# Patient Record
Sex: FEMALE | Race: BLACK OR AFRICAN AMERICAN | Employment: FULL TIME | URBAN - METROPOLITAN AREA
[De-identification: names, ages, dates, MRNs, and addresses within clinical notes are randomized per-mention and may not be internally consistent; named-entity substitution may affect disease eponyms.]

---

## 2017-01-18 ENCOUNTER — OFFICE VISIT (OUTPATIENT)
Dept: FAMILY MEDICINE CLINIC | Age: 52
End: 2017-01-18

## 2017-01-18 ENCOUNTER — HOSPITAL ENCOUNTER (OUTPATIENT)
Dept: GENERAL RADIOLOGY | Age: 52
Discharge: HOME | End: 2017-01-18
Payer: COMMERCIAL

## 2017-01-18 VITALS
SYSTOLIC BLOOD PRESSURE: 129 MMHG | BODY MASS INDEX: 28.54 KG/M2 | DIASTOLIC BLOOD PRESSURE: 87 MMHG | HEART RATE: 73 BPM | OXYGEN SATURATION: 99 % | TEMPERATURE: 97.9 F | HEIGHT: 66 IN | RESPIRATION RATE: 16 BRPM | WEIGHT: 177.6 LBS

## 2017-01-18 DIAGNOSIS — R05.9 COUGH: ICD-10-CM

## 2017-01-18 DIAGNOSIS — J20.9 ACUTE BRONCHITIS, UNSPECIFIED ORGANISM: ICD-10-CM

## 2017-01-18 DIAGNOSIS — J20.9 ACUTE BRONCHITIS, UNSPECIFIED ORGANISM: Primary | ICD-10-CM

## 2017-01-18 DIAGNOSIS — R07.81 RIB PAIN ON RIGHT SIDE: ICD-10-CM

## 2017-01-18 PROCEDURE — 71020 XR CHEST PA LAT: CPT

## 2017-01-18 RX ORDER — PROMETHAZINE HYDROCHLORIDE AND CODEINE PHOSPHATE 6.25; 1 MG/5ML; MG/5ML
5 SOLUTION ORAL
Qty: 240 ML | Refills: 0 | Status: SHIPPED | OUTPATIENT
Start: 2017-01-18 | End: 2017-06-19

## 2017-01-18 RX ORDER — AZITHROMYCIN 500 MG/1
500 TABLET, FILM COATED ORAL DAILY
Qty: 3 TAB | Refills: 0 | Status: SHIPPED | OUTPATIENT
Start: 2017-01-18 | End: 2017-01-21

## 2017-01-18 NOTE — PROGRESS NOTES
Chief Complaint   Patient presents with    Cough     coughing up yellow phlegm    Back Pain     right back pain-1/10 presently.

## 2017-01-18 NOTE — PROGRESS NOTES
HISTORY OF PRESENT ILLNESS  Magalys Harper is a 46 y.o. female. Cough   The history is provided by the patient. This is a new problem. Episode onset: over a week ago. Episode frequency: intermittently. The problem has been gradually worsening. The cough is productive of purulent sputum. Patient reports a subjective (initially) fever - was not measured. Associated symptoms include eye redness and headaches. Pertinent negatives include no chest pain, no chills, no ear pain, no rhinorrhea, no sore throat, no shortness of breath and no wheezing. Associated symptoms comments: Right posterior rib pain. Treatments tried: Nyquil. The treatment provided significant relief. She is not a smoker. Her past medical history is significant for bronchitis. Her past medical history does not include pneumonia, bronchiectasis, COPD, asthma or heart failure. Review of Systems   Constitutional: Positive for malaise/fatigue. Negative for chills and fever. HENT: Negative for congestion, ear pain, rhinorrhea and sore throat. Mucous is yellow in color. There is no sinus pain. Eyes: Positive for redness. Negative for discharge. Respiratory: Positive for cough, hemoptysis and sputum production. Negative for shortness of breath and wheezing. Her sputum is yellow. She did have hemoptysis once, but that was after blowing her bloody nose. Cardiovascular: Negative for chest pain. Neurological: Positive for headaches. Visit Vitals    /87 (BP 1 Location: Left arm, BP Patient Position: Sitting)    Pulse 73    Temp 97.9 °F (36.6 °C) (Oral)    Resp 16    Ht 5' 6\" (1.676 m)    Wt 177 lb 9.6 oz (80.6 kg)    SpO2 99%    BMI 28.67 kg/m2     Physical Exam   Constitutional: She is oriented to person, place, and time. She appears well-developed and well-nourished. No distress. HENT:   Head: Normocephalic.    Right Ear: Tympanic membrane, external ear and ear canal normal.   Left Ear: Tympanic membrane, external ear and ear canal normal.   Nose: Nose normal. Right sinus exhibits no maxillary sinus tenderness and no frontal sinus tenderness. Left sinus exhibits no maxillary sinus tenderness and no frontal sinus tenderness. Mouth/Throat: Uvula is midline, oropharynx is clear and moist and mucous membranes are normal.   Eyes: Right eye exhibits no discharge. Left eye exhibits no discharge. Right conjunctiva is not injected. Left conjunctiva is not injected. Cardiovascular: Normal rate, regular rhythm and normal heart sounds. Exam reveals no gallop and no friction rub. No murmur heard. Pulmonary/Chest: Effort normal and breath sounds normal. No respiratory distress. She has no wheezes. She has no rales. Lymphadenopathy:     She has no cervical adenopathy. Neurological: She is alert and oriented to person, place, and time. Skin: Skin is warm and dry. She is not diaphoretic. Nursing note and vitals reviewed. ASSESSMENT and PLAN    ICD-10-CM ICD-9-CM    1. Acute bronchitis, unspecified organism J20.9 466.0 azithromycin (ZITHROMAX) 500 mg tab      XR CHEST PA LAT   2. Cough R05 786.2 promethazine-codeine (PHENERGAN WITH CODEINE) 6.25-10 mg/5 mL syrup      XR CHEST PA LAT   3. Rib pain on right side R07.81 786.50 XR CHEST PA LAT        Bronchitis with rib pain due to cough  Rest push fluids  Zithromax  Phenergan with Codeine prn,  web site reviewed. chest X-ray    Follow-up Disposition:  Return if not better in 5 days. Reviewed plan of care. Patient has provided input and agrees with goals.

## 2017-01-18 NOTE — MR AVS SNAPSHOT
Visit Information Date & Time Provider Department Dept. Phone Encounter #  
 1/18/2017 11:15 AM Kevin Cuello MD [facility] 88 [id number] Follow-up Instructions Return if not better in 5 days. Upcoming Health Maintenance Date Due  
 BREAST CANCER SCRN MAMMOGRAM 1/8/2018 PAP AKA CERVICAL CYTOLOGY 10/20/2018 DTaP/Tdap/Td series (2 - Td) 1/20/2021 COLONOSCOPY 12/2/2023 Allergies as of 1/18/2017  Review Complete On: 1/18/2017 By: Kevin Cuello MD  
 No Known Allergies Current Immunizations  Reviewed on 12/14/2016 Name Date Influenza Vaccine (Quad) PF 12/14/2016 10:44 AM, 10/20/2015 Influenza Vaccine Split 10/1/2010 TDAP Vaccine 1/20/2011 Not reviewed this visit You Were Diagnosed With   
  
 Codes Comments Acute bronchitis, unspecified organism    -  Primary ICD-10-CM: J20.9 ICD-9-CM: 466.0 Cough     ICD-10-CM: R05 ICD-9-CM: 786.2 Rib pain on right side     ICD-10-CM: R07.81 ICD-9-CM: 786.50 Vitals BP Pulse Temp Resp Height(growth percentile) Weight(growth percentile) 129/87 (BP 1 Location: Left arm, BP Patient Position: Sitting) 73 97.9 °F (36.6 °C) (Oral) 16 5' 6\" (1.676 m) 177 lb 9.6 oz (80.6 kg) SpO2 BMI OB Status Smoking Status 99% 28.67 kg/m2 Hysterectomy Never Smoker Vitals History BMI and BSA Data Body Mass Index Body Surface Area  
 28.67 kg/m 2 1.94 m 2 Preferred Pharmacy Pharmacy Name Phone [facility] [place], [address] [place] [facility] [phone] Your Updated Medication List  
  
   
This list is accurate as of: 1/18/17 12:32 PM.  Always use your most recent med list.  
  
  
  
  
 azithromycin 500 mg Tab Commonly known as:  Griselda Collar Take 1 Tab by mouth daily for 3 days. Biotin 2,500 mcg Cap Take  by mouth.  
  
 cyanocobalamin 100 mcg tablet Commonly known as:  VITAMIN B12 Take 100 mcg by mouth daily. DAILY MULTI-VITAMINS/IRON tablet Generic drug:  multivitamin with iron Take 1 Tab by mouth daily. FISH OIL-VIT D3 PO Take  by mouth. folic acid 214 mcg tablet Take 800 mcg by mouth daily. Glucosamine &Chondroit-MV-Min3 212-906-78-0.5 mg Tab Take  by mouth. MELATIN PO Take  by mouth nightly. promethazine-codeine 6.25-10 mg/5 mL syrup Commonly known as:  PHENERGAN with CODEINE Take 5 mL by mouth every six (6) hours as needed for Cough. Max Daily Amount: 20 mL. VITAMIN D3 1,000 unit tablet Generic drug:  cholecalciferol Take  by mouth daily. VITAMIN E PO Take  by mouth. Prescriptions Printed Refills  
 promethazine-codeine (PHENERGAN WITH CODEINE) 6.25-10 mg/5 mL syrup 0 Sig: Take 5 mL by mouth every six (6) hours as needed for Cough. Max Daily Amount: 20 mL. Class: Print Route: Oral  
  
Prescriptions Sent to Pharmacy Refills  
 azithromycin (ZITHROMAX) 500 mg tab 0 Sig: Take 1 Tab by mouth daily for 3 days. Class: Normal  
 Pharmacy: [email or web address] [place] [address] [address] [facility] Ph #: [phone] Route: Oral  
  
Follow-up Instructions Return if not better in 5 days. To-Do List   
 01/18/2017 Imaging:  XR CHEST PA LAT   
  
 01/20/2017 11:30 AM  
  Appointment with [facility] 2 at [facility] ([phone]) Shower or bathe using soap and water. Do not use deodorant, powder, perfumes, or lotion the day of your exam.  If your prior mammograms were not performed at [facility] 6 please bring films with you or forward prior images 2 days before your procedure. Check in at registration 15min before your appointment time unless you were instructed to do otherwise.   A script is not necessary, but if you have one, please bring it on the day of the mammogram or have it faxed to the department. [facility] [phone] [facility]  [phone] [facility] 19 JOE  [phone] [facility] [phone] [place] [address] [facility] Sonny Jeffries [phone] Introducing [facility] & [facility] SERVICES! Dear Dusty Ross: Thank you for requesting a ice account. Our records indicate that you already have an active ice account. You can access your account anytime at [email or web address] Did you know that you can access your hospital and ER discharge instructions at any time in ice? You can also review all of your test results from your hospital stay or ER visit. Additional Information If you have questions, please visit the Frequently Asked Questions section of the ice website at [email or web address] Remember, ice is NOT to be used for urgent needs. For medical emergencies, dial 911. Now available from your iPhone and Android! Please provide this summary of care documentation to your next provider. Your primary care clinician is listed as Lolita Tang. If you have any questions after today's visit, please call [phone].

## 2017-01-20 ENCOUNTER — HOSPITAL ENCOUNTER (OUTPATIENT)
Dept: MAMMOGRAPHY | Age: 52
Discharge: HOME | End: 2017-01-20
Payer: COMMERCIAL

## 2017-01-20 DIAGNOSIS — Z12.31 VISIT FOR SCREENING MAMMOGRAM: ICD-10-CM

## 2017-01-20 PROCEDURE — 77067 SCR MAMMO BI INCL CAD: CPT

## 2017-01-20 NOTE — LETTER
1/23/2017 11:35 AM 
 
Ms. Bronson Shelton [address] [place] [id number] Dear Bronson Braxton: 
 
Please find your most recent results below. Resulted Orders CYN[facility]E MAMMO BI SCREENING DIGTL Narrative STUDY:  Bilateral Digital Screening Mammogram 
 
INDICATION:  Screening COMPARISON:  Most recent 2016 BREAST COMPOSITION: There are scattered fibroglandular densities (approximately 25-50% glandular). FINDINGS: Bilateral digital screening mammography was performed, and is 
interpreted in conjunction with a computer assisted detection (CAD) system. No 
suspicious masses or calcifications are identified. There is no skin thickening 
or nipple retraction. There has been no significant change. Impression IMPRESSION: 
 
BIRADS 1: Negative. No mammographic evidence of malignancy. Next screening mammogram is recommended in one year. The patient will be 
notified of these results. RECOMMENDATIONS: 
 
Your recent mammogram was normal.  Congratulations.  Please follow up in one year. Sincerely, Elie Pascal MD

## 2017-03-16 DIAGNOSIS — D70.9 NEUTROPENIA, UNSPECIFIED TYPE (HCC): ICD-10-CM

## 2017-06-19 ENCOUNTER — OFFICE VISIT (OUTPATIENT)
Dept: FAMILY MEDICINE CLINIC | Age: 52
End: 2017-06-19

## 2017-06-19 ENCOUNTER — TELEPHONE (OUTPATIENT)
Dept: FAMILY MEDICINE CLINIC | Age: 52
End: 2017-06-19

## 2017-06-19 VITALS
OXYGEN SATURATION: 100 % | TEMPERATURE: 97.9 F | SYSTOLIC BLOOD PRESSURE: 153 MMHG | WEIGHT: 177.6 LBS | DIASTOLIC BLOOD PRESSURE: 85 MMHG | BODY MASS INDEX: 28.54 KG/M2 | HEART RATE: 58 BPM | HEIGHT: 66 IN | RESPIRATION RATE: 16 BRPM

## 2017-06-19 DIAGNOSIS — J20.9 ACUTE BRONCHITIS, UNSPECIFIED ORGANISM: Primary | ICD-10-CM

## 2017-06-19 DIAGNOSIS — J06.9 VIRAL UPPER RESPIRATORY TRACT INFECTION: ICD-10-CM

## 2017-06-19 DIAGNOSIS — R03.0 ELEVATED BLOOD PRESSURE READING: ICD-10-CM

## 2017-06-19 RX ORDER — AZITHROMYCIN 500 MG/1
500 TABLET, FILM COATED ORAL DAILY
Qty: 3 TAB | Refills: 0 | Status: SHIPPED | OUTPATIENT
Start: 2017-06-19 | End: 2017-06-22

## 2017-06-19 NOTE — MR AVS SNAPSHOT
Visit Information Date & Time Provider Department Dept. Phone Encounter #  
 6/19/2017  1:00 PM Achille Lesch, Vida Goff [id number] Upcoming Health Maintenance Date Due INFLUENZA AGE 9 TO ADULT 8/1/2017 PAP AKA CERVICAL CYTOLOGY 10/20/2018 BREAST CANCER SCRN MAMMOGRAM 1/20/2019 DTaP/Tdap/Td series (2 - Td) 1/20/2021 COLONOSCOPY 12/2/2023 Allergies as of 6/19/2017  Review Complete On: 6/19/2017 By: Achille Lesch, MD  
 No Known Allergies Current Immunizations  Reviewed on 12/14/2016 Name Date Influenza Vaccine (Quad) PF 12/14/2016 10:44 AM, 10/20/2015 Influenza Vaccine Split 10/1/2010 TDAP Vaccine 1/20/2011 Not reviewed this visit You Were Diagnosed With   
  
 Codes Comments Acute bronchitis, unspecified organism    -  Primary ICD-10-CM: J20.9 ICD-9-CM: 466.0 Viral upper respiratory tract infection     ICD-10-CM: J06.9, B97.89 ICD-9-CM: 465.9 Elevated blood pressure reading     ICD-10-CM: R03.0 ICD-9-CM: 796.2 Vitals BP Pulse Temp Resp Height(growth percentile) Weight(growth percentile) 153/85 (BP 1 Location: Left arm, BP Patient Position: Sitting) (!) 58 97.9 °F (36.6 °C) (Oral) 16 5' 6\" (1.676 m) 177 lb 9.6 oz (80.6 kg) SpO2 BMI OB Status Smoking Status 100% 28.67 kg/m2 Hysterectomy Never Smoker Vitals History BMI and BSA Data Body Mass Index Body Surface Area  
 28.67 kg/m 2 1.94 m 2 Preferred Pharmacy Pharmacy Name Phone [facility] [place], [address] [place] AT [facility] [phone] Your Updated Medication List  
  
   
This list is accurate as of: 6/19/17  1:29 PM.  Always use your most recent med list.  
  
  
  
  
 azithromycin 500 mg Tab Commonly known as:  Christella Pian Take 1 Tab by mouth daily for 3 days. Biotin 2,500 mcg Cap Take  by mouth. CORICIDIN HBP COUGH AND COLD 4-30 mg Tab Generic drug:  chlorpheniramine-dm Take  by mouth.  
  
 cyanocobalamin 100 mcg tablet Commonly known as:  VITAMIN B12 Take 100 mcg by mouth daily. DAILY MULTI-VITAMINS/IRON tablet Generic drug:  multivitamin with iron Take 1 Tab by mouth daily. FISH OIL-VIT D3 PO Take  by mouth. folic acid 725 mcg tablet Take 800 mcg by mouth daily. Glucosamine &Chondroit-MV-Min3 449-789-17-0.5 mg Tab Take  by mouth. MELATIN PO Take  by mouth nightly. VITAMIN D3 1,000 unit tablet Generic drug:  cholecalciferol Take  by mouth daily. VITAMIN E PO Take  by mouth. ZINC NATURAL PO Take  by mouth daily. Prescriptions Sent to Pharmacy Refills  
 azithromycin (ZITHROMAX) 500 mg tab 0 Sig: Take 1 Tab by mouth daily for 3 days. Class: Normal  
 Pharmacy: [facility] [address] [facility] #: [phone] Route: Oral  
  
Introducing [facility] & HEALTH SERVICES! Dear Thomas Hernandez: Thank you for requesting a [employer] account. Our records indicate that you already have an active [employer] account. You can access your account anytime at [email or web address] Did you know that you can access your hospital and ER discharge instructions at any time in 800razors? You can also review all of your test results from your hospital stay or ER visit. Additional Information If you have questions, please visit the Frequently Asked Questions section of the [employer] website at [email or web address] Remember, 800[employer] is NOT to be used for urgent needs. For medical emergencies, dial 911. Now available from your iPhone and Android! Please provide this summary of care documentation to your next provider. Your primary care clinician is listed as Katherine Huffman.  If you have any questions after today's visit, please call [phone].

## 2017-06-19 NOTE — PROGRESS NOTES
HISTORY OF PRESENT ILLNESS  Nadine Casey is a 45 y.o. female. URI    The history is provided by the patient. This is a new problem. Episode onset: about 2 weeks ago. The problem has been gradually improving. There has been no fever. Associated symptoms include chest pain, congestion, headaches, plugged ear sensation, rhinorrhea, sinus pain, sore throat, cough and wheezing. Pertinent negatives include no ear pain. Treatments tried: Nyquil, Dayquil, Mucinex DM. The treatment provided mild relief. Review of Systems   Constitutional: Positive for malaise/fatigue. Negative for chills and fever. HENT: Positive for congestion, rhinorrhea and sore throat. Negative for ear pain. Mucous is clear in color. There is sinus pain. Eyes: Negative for discharge and redness. Respiratory: Positive for cough, sputum production, shortness of breath and wheezing. Negative for hemoptysis. Her sputum is yellow to clear   Cardiovascular: Positive for chest pain. Neurological: Positive for headaches. Visit Vitals    /85 (BP 1 Location: Left arm, BP Patient Position: Sitting)    Pulse (!) 58    Temp 97.9 °F (36.6 °C) (Oral)    Resp 16    Ht 5' 6\" (1.676 m)    Wt 177 lb 9.6 oz (80.6 kg)    SpO2 100%    BMI 28.67 kg/m2     BP Readings from Last 3 Encounters:   06/19/17 153/85   01/18/17 129/87   12/14/16 128/78     Physical Exam   Constitutional: She is oriented to person, place, and time. She appears well-developed and well-nourished. No distress. HENT:   Head: Normocephalic. Right Ear: Tympanic membrane, external ear and ear canal normal.   Left Ear: Tympanic membrane, external ear and ear canal normal.   Nose: Nose normal. Right sinus exhibits no maxillary sinus tenderness and no frontal sinus tenderness. Left sinus exhibits no maxillary sinus tenderness and no frontal sinus tenderness.    Mouth/Throat: Uvula is midline, oropharynx is clear and moist and mucous membranes are normal.   Eyes: Right eye exhibits no discharge. Left eye exhibits no discharge. Right conjunctiva is not injected. Left conjunctiva is not injected. Cardiovascular: Normal rate, regular rhythm and normal heart sounds. Exam reveals no gallop and no friction rub. No murmur heard. Pulmonary/Chest: Effort normal and breath sounds normal. No respiratory distress. She has no wheezes. She has no rales. Lymphadenopathy:     She has no cervical adenopathy. Neurological: She is alert and oriented to person, place, and time. Skin: Skin is warm and dry. She is not diaphoretic. Nursing note and vitals reviewed. ASSESSMENT and PLAN    ICD-10-CM ICD-9-CM    1. Acute bronchitis, unspecified organism J20.9 466.0 azithromycin (ZITHROMAX) 500 mg tab   2. Viral upper respiratory tract infection J06.9 465.9 chlorpheniramine-dm (CORICIDIN HBP COUGH AND COLD) 4-30 mg tab    B97.89     3. Elevated blood pressure reading R03.0 796.2         Bronchitis due to URI  Blood pressure elevated today, likely due to cough  Rest, push fluids  Zithromax  Coricidin HBP prn    Follow-up Disposition:  Return in about 3 months (around 9/19/2017) for blood pressure/if not better in 3 days. Reviewed plan of care. Patient has provided input and agrees with goals.

## 2017-06-19 NOTE — TELEPHONE ENCOUNTER
----- Message from Laury Matt sent at 6/19/2017  7:40 AM EDT -----  Regarding: Dr. Funes Postin: [phone]  Time Felicia Ospina would like a same day appointment for 6/19/2017 for persistent flu like symptoms.

## 2017-06-19 NOTE — PROGRESS NOTES
Chief Complaint   Patient presents with    Nasal Congestion     /chest congestion    Cough     coughing up green/yellow phlegm last week.     Headache     3/10

## 2017-10-26 ENCOUNTER — OFFICE VISIT (OUTPATIENT)
Dept: FAMILY MEDICINE CLINIC | Age: 52
End: 2017-10-26

## 2017-10-26 VITALS
HEART RATE: 54 BPM | WEIGHT: 166 LBS | SYSTOLIC BLOOD PRESSURE: 137 MMHG | BODY MASS INDEX: 26.68 KG/M2 | RESPIRATION RATE: 18 BRPM | TEMPERATURE: 97.8 F | HEIGHT: 66 IN | DIASTOLIC BLOOD PRESSURE: 83 MMHG

## 2017-10-26 DIAGNOSIS — Z23 ENCOUNTER FOR IMMUNIZATION: ICD-10-CM

## 2017-10-26 DIAGNOSIS — Z00.00 ROUTINE GENERAL MEDICAL EXAMINATION AT A HEALTH CARE FACILITY: Primary | ICD-10-CM

## 2017-10-26 DIAGNOSIS — R03.0 ELEVATED BLOOD PRESSURE READING: ICD-10-CM

## 2017-10-26 DIAGNOSIS — Z12.11 SCREEN FOR COLON CANCER: ICD-10-CM

## 2017-10-26 NOTE — PROGRESS NOTES
Subjective: Tiffanie Mari is a 45 y.o. female here for annual physical exam.  Her annual gyn exam was this past January. Also, her blood pressure was elevated at her last visit, and she is here for follow up. Health Habits. Lifestyle:  Occupation:    Household members:  1, patient  Doing a monthly breast self exam:  no  Last dental appointment: This month  Last eye exam:  Almost a year ago  Uses seatbelts regularly :  yes  Getting regular exercise:  yes  Last colonoscopy:   12/2013  Last mammogram:  1/2017       Patient Active Problem List   Diagnosis Code    PE (pulmonary embolism) I26.99    Ankle fracture S82.899A    S/P MIKY-BSO Z90.710, Z90.722, Z90.79    GERD (gastroesophageal reflux disease) K21.9    History of normal resting EKG [id number]    S/P colonoscopy Z98.890    Migraine with aura and without status migrainosus, not intractable G43. 109    Meralgia paresthetica of left side G57.12    Family history of premature CAD Z82.49     Past Medical History:   Diagnosis Date    DVT (deep vein thrombosis) in pregnancy ([facility] [address]     2005    Family history of premature CAD 12/14/2016    GERD (gastroesophageal reflux disease)     H. pylori infection 7/2013    Meralgia paresthetica of left side 12/14/2016    Migraine with aura and without status migrainosus, not intractable 12/14/2016    PE (pulmonary embolism)     2005    Thromboembolus ([facility] [address]     mom did too     Past Surgical History:   Procedure Laterality Date    HX ANKLE FRACTURE TX  2005    left ankle-hardware    HX GYN      hysterectomy    HX ORTHOPAEDIC      Carpel Tunnel surgery    HX REFRACTIVE SURGERY        Family History   Problem Relation Age of Onset    Heart Disease Mother 50     MI    Hypertension Mother     Deep Vein Thrombosis Mother     Depression Mother     Heart Disease Father 72     MI    Hypertension Father     Seizures Father     Migraines Daughter     Attention Deficit Hyperactivity Disorder Daughter     Depression Daughter    [place] Spell Migraines Sister     Diabetes Sister     Stroke Sister     Cancer Brother      bone    Neuropathy Brother     No Known Problems Sister     No Known Problems Sister     Stroke Sister     Hypertension Sister     No Known Problems Brother     Cancer Brother     No Known Problems Brother     No Known Problems Son      Social History   Substance Use Topics    Smoking status: Never Smoker    Smokeless tobacco: Never Used    Alcohol use 1.0 oz/week     1 Glasses of wine, 1 Shots of liquor per week      Comment: occ wine or madison     No Known Allergies  Current Outpatient Prescriptions   Medication Sig Dispense Refill    ZINC GLUCONATE (ZINC NATURAL PO) Take  by mouth daily.  chlorpheniramine-dm (CORICIDIN HBP COUGH AND COLD) 4-30 mg tab Take  by mouth.  MELATONIN (MELATIN PO) Take  by mouth nightly.  folic acid 236 mcg tablet Take 800 mcg by mouth daily.  multivitamin with iron (DAILY MULTI-VITAMINS/IRON) tablet Take 1 Tab by mouth daily.  cholecalciferol (VITAMIN D3) 1,000 unit tablet Take  by mouth daily.  Biotin 2,500 mcg cap Take  by mouth.  Glucosamine &Chondroit-MV-Min3 211-088-20-0.5 mg tab Take  by mouth.  VITAMIN E ACETATE (VITAMIN E PO) Take  by mouth.  cyanocobalamin (VITAMIN B12) 100 mcg tablet Take 100 mcg by mouth daily.  OM-3/DHA/EPA/FISH OIL/VIT D3 (FISH OIL-VIT D3 PO) Take  by mouth.           Review of Systems  A comprehensive review of systems was negative except for: Musculoskeletal: positive for left ankle swelling from time to time due to an old fracture  Neurological: positive for migraines, about once or twice every 2 months  Allergic/Immunologic: positive for hay fever    Objective:  Visit Vitals    /83    Pulse (!) 54    Temp 97.8 °F (36.6 °C) (Oral)    Resp 18    Ht 5' 6\" (1.676 m)    Wt 166 lb (75.3 kg)    BMI 26.79 kg/m2     Physical Examination:   General appearance - alert, well appearing, and in no distress  Mental status - alert, oriented to person, place, and time, normal mood, behavior, speech, dress, motor activity, and thought processes  Eyes - pupils equal and reactive, extraocular eye movements intact, sclera anicteric  Ears - bilateral TM's and external ear canals normal  Nose - normal and patent, no erythema, discharge or polyps  Mouth - mucous membranes moist, pharynx normal without lesions and dental hygiene poor  Neck - supple, no significant adenopathy, carotids upstroke normal bilaterally, no bruits, thyroid exam: thyroid is normal in size without nodules or tenderness  Lymphatics - no palpable lymphadenopathy, no hepatosplenomegaly  Chest - clear to auscultation, no wheezes, rales or rhonchi, symmetric air entry  Heart - normal rate, regular rhythm, normal S1, S2, no murmurs, rubs, clicks or gallops  Abdomen - soft, nontender, nondistended, no masses or organomegaly  bowel sounds normal  Breasts - deferred to gyn  Pelvic - deferred to gyn  Rectal - Kit for FOBT testing given to patient  Neurological - alert, oriented, normal speech, no focal findings or movement disorder noted  Musculoskeletal - normal gait  Extremities - peripheral pulses normal, no pedal edema, no clubbing or cyanosis  Skin - normal coloration and turgor, no rashes, no suspicious skin lesions noted     Assessment/Plan:    ICD-10-CM ICD-9-CM    1. Routine general medical examination at a health care facility O13.39 C48.7 METABOLIC PANEL, COMPREHENSIVE      CBC WITH AUTOMATED DIFF   2. Elevated blood pressure reading R03.0 796.2    3. Encounter for immunization Z23 V03.89 INFLUENZA VIRUS VAC QUAD,SPLIT,PRESV FREE SYRINGE IM   4. Screen for colon cancer Z12.11 V76.51 OCCULT BLOOD, IMMUNOASSAY (FIT)         Blood pressure now normal  Breast awareness  Labs per orders. Flu shot today    Follow-up Disposition:  Return in about 1 year (around 10/26/2018) for physical.      Reviewed plan of care.   Patient has provided input and agrees with goals.

## 2017-10-26 NOTE — PROGRESS NOTES
Chief Complaint   Patient presents with    Well Woman     No PAP    Immunization/Injection     Flu      Health Maintenance   Topic Date Due    INFLUENZA AGE 9 TO ADULT  08/01/2017    PAP AKA CERVICAL CYTOLOGY  10/20/2018    BREAST CANCER SCRN MAMMOGRAM  01/20/2019    DTaP/Tdap/Td series (2 - Td) 01/20/2021    COLONOSCOPY  12/02/2023    Hepatitis C Screening  Completed

## 2017-10-26 NOTE — PATIENT INSTRUCTIONS
Vaccine Information Statement    Influenza (Flu) Vaccine (Inactivated or Recombinant): What you need to know    Many Vaccine Information Statements are available in Danish and other languages. See www.immunize.org/vis  Hojas de Información Sobre Vacunas están disponibles en Español y en muchos otros idiomas. Visite www.immunize.org/vis    1. Why get vaccinated? Influenza (flu) is a contagious disease that spreads around the [place] every year, usually between October and May. Flu is caused by influenza viruses, and is spread mainly by coughing, sneezing, and close contact. Anyone can get flu. Flu strikes suddenly and can last several days. Symptoms vary by age, but can include:   fever/chills   sore throat   muscle aches   fatigue   cough   headache    runny or stuffy nose    Flu can also lead to pneumonia and blood infections, and cause diarrhea and seizures in children. If you have a medical condition, such as heart or lung disease, flu can make it worse. Flu is more dangerous for some people. Infants and young children, people 72years of age and older, pregnant women, and people with certain health conditions or a weakened immune system are at greatest risk. Each year thousands of people in the [facility] die from flu, and many more are hospitalized. Flu vaccine can:   keep you from getting flu,   make flu less severe if you do get it, and   keep you from spreading flu to your family and other people. 2. Inactivated and recombinant flu vaccines    A dose of flu vaccine is recommended every flu season. Children 6 months through 6years of age may need two doses during the same flu season. Everyone else needs only one dose each flu season.        Some inactivated flu vaccines contain a very small amount of a mercury-based preservative called thimerosal. Studies have not shown thimerosal in vaccines to be harmful, but flu vaccines that do not contain thimerosal are available. There is no live flu virus in flu shots. They cannot cause the flu. There are many flu viruses, and they are always changing. Each year a new flu vaccine is made to protect against three or four viruses that are likely to cause disease in the upcoming flu season. But even when the vaccine doesnt exactly match these viruses, it may still provide some protection    Flu vaccine cannot prevent:   flu that is caused by a virus not covered by the vaccine, or   illnesses that look like flu but are not. It takes about 2 weeks for protection to develop after vaccination, and protection lasts through the flu season. 3. Some people should not get this vaccine    Tell the person who is giving you the vaccine:     If you have any severe, life-threatening allergies. If you ever had a life-threatening allergic reaction after a dose of flu vaccine, or have a severe allergy to any part of this vaccine, you may be advised not to get vaccinated. Most, but not all, types of flu vaccine contain a small amount of egg protein.  If you ever had Guillain-Barré Syndrome (also called GBS). Some people with a history of GBS should not get this vaccine. This should be discussed with your doctor.  If you are not feeling well. It is usually okay to get flu vaccine when you have a mild illness, but you might be asked to come back when you feel better. 4. Risks of a vaccine reaction    With any medicine, including vaccines, there is a chance of reactions. These are usually mild and go away on their own, but serious reactions are also possible. Most people who get a flu shot do not have any problems with it.      Minor problems following a flu shot include:    soreness, redness, or swelling where the shot was given     hoarseness   sore, red or itchy eyes   cough   fever   aches   headache   itching   fatigue  If these problems occur, they usually begin soon after the shot and last 1 or 2 days. More serious problems following a flu shot can include the following:     There may be a small increased risk of Guillain-Barré Syndrome (GBS) after inactivated flu vaccine. This risk has been estimated at 1 or 2 additional cases per million people vaccinated. This is much lower than the risk of severe complications from flu, which can be prevented by flu vaccine.  Young children who get the flu shot along with pneumococcal vaccine (PCV13) and/or DTaP vaccine at the same time might be slightly more likely to have a seizure caused by fever. Ask your doctor for more information. Tell your doctor if a child who is getting flu vaccine has ever had a seizure. Problems that could happen after any injected vaccine:      People sometimes faint after a medical procedure, including vaccination. Sitting or lying down for about 15 minutes can help prevent fainting, and injuries caused by a fall. Tell your doctor if you feel dizzy, or have vision changes or ringing in the ears.  Some people get severe pain in the shoulder and have difficulty moving the arm where a shot was given. This happens very rarely.  Any medication can cause a severe allergic reaction. Such reactions from a vaccine are very rare, estimated at about 1 in a million doses, and would happen within a few minutes to a few hours after the vaccination. As with any medicine, there is a very remote chance of a vaccine causing a serious injury or death. The safety of vaccines is always being monitored. For more information, visit: www.cdc.gov/vaccinesafety/    5. What if there is a serious reaction? What should I look for?  Look for anything that concerns you, such as signs of a severe allergic reaction, very high fever, or unusual behavior.     Signs of a severe allergic reaction can include hives, swelling of the face and throat, difficulty breathing, a fast heartbeat, dizziness, and weakness  usually within a few minutes to a few hours after the vaccination. What should I do?  If you think it is a severe allergic reaction or other emergency that cant wait, call 9-1-1 and get the person to the nearest hospital. Otherwise, call your doctor.  Reactions should be reported to the Vaccine Adverse Event Reporting System (VAERS). Your doctor should file this report, or you can do it yourself through  the VAERS web site at www.vaers. [facility].gov, or by calling 2-961.469.5498. VAERS does not give medical advice. 6. The National Vaccine Injury Compensation Program    The [facility] Vaccine Injury Compensation Program (VICP) is a federal program that was created to compensate people who may have been injured by certain vaccines. Persons who believe they may have been injured by a vaccine can learn about the program and about filing a claim by calling 9-900.371.5284 or visiting the [employer] website at www.[facility].gov/vaccinecompensation. There is a time limit to file a claim for compensation. 7. How can I learn more?  Ask your healthcare provider. He or she can give you the vaccine package insert or suggest other sources of information.  Call your local or state health department.  Contact the Centers for Disease Control and Prevention (CDC):  - Call 8-985.515.7282 (1-800-CDC-INFO) or  - Visit CDCs website at www.cdc.gov/flu    Vaccine Information Statement   Inactivated Influenza Vaccine   8/7/2015  42 GABY Diaz [id number]-42    Department of Health and Human Services  Centers for Disease Control and Prevention    Office Use Only

## 2017-10-26 NOTE — MR AVS SNAPSHOT
Visit Information Date & Time Provider Department Dept. Phone Encounter #  
 10/26/2017  8:15 AM Mariama Cunha 32 [id number] Follow-up Instructions Return in about 1 year (around 10/26/2018) for physical.  
  
Upcoming Health Maintenance Date Due INFLUENZA AGE 9 TO ADULT 8/1/2017 PAP AKA CERVICAL CYTOLOGY 10/20/2018 BREAST CANCER SCRN MAMMOGRAM 1/20/2019 DTaP/Tdap/Td series (2 - Td) 1/20/2021 COLONOSCOPY 12/2/2023 Allergies as of 10/26/2017  Review Complete On: 10/26/2017 By: Mau Gibbs MD  
 No Known Allergies Current Immunizations  Reviewed on 10/26/2017 Name Date Influenza Vaccine (Quad) PF 10/26/2017  8:39 AM, 12/14/2016 10:44 AM, 10/20/2015 Influenza Vaccine Split 10/1/2010 TDAP Vaccine 1/20/2011 Reviewed by Miguel A Herrera LPN on 14/81/1750 at  8:40 AM  
You Were Diagnosed With   
  
 Codes Comments Encounter for immunization    -  Primary ICD-10-CM: V52 ICD-9-CM: V03.89 Routine general medical examination at a health care facility     ICD-10-CM: Z00.00 ICD-9-CM: V70.0 Screen for colon cancer     ICD-10-CM: Z12.11 ICD-9-CM: V76.51 Elevated blood pressure reading     ICD-10-CM: R03.0 ICD-9-CM: 796.2 Vitals BP Pulse Temp Resp Height(growth percentile) Weight(growth percentile) 137/83 (!) 54 97.8 °F (36.6 °C) (Oral) 18 5' 6\" (1.676 m) 166 lb (75.3 kg) BMI OB Status Smoking Status 26.79 kg/m2 Hysterectomy Never Smoker BMI and BSA Data Body Mass Index Body Surface Area  
 26.79 kg/m 2 1.87 m 2 Preferred Pharmacy Pharmacy Name Phone [facility] [place], [address] [place] [facility] [phone] Your Updated Medication List  
  
   
This list is accurate as of: 10/26/17  9:04 AM.  Always use your most recent med list.  
  
  
  
  
 Biotin 2,500 mcg Cap Take  by mouth. CORICIDIN HBP COUGH AND COLD 4-30 mg Tab Generic drug:  chlorpheniramine-dm Take  by mouth.  
  
 cyanocobalamin 100 mcg tablet Commonly known as:  VITAMIN B12 Take 100 mcg by mouth daily. DAILY MULTI-VITAMINS/IRON tablet Generic drug:  multivitamin with iron Take 1 Tab by mouth daily. FISH OIL-VIT D3 PO Take  by mouth. folic acid 267 mcg tablet Take 800 mcg by mouth daily. Glucosamine &Chondroit-MV-Min3 586-740-05-0.5 mg Tab Take  by mouth. MELATIN PO Take  by mouth nightly. VITAMIN D3 1,000 unit tablet Generic drug:  cholecalciferol Take  by mouth daily. VITAMIN E PO Take  by mouth. ZINC NATURAL PO Take  by mouth daily. We Performed the Following CBC WITH AUTOMATED DIFF [90566 CPT(R)] INFLUENZA VIRUS VAC QUAD,SPLIT,PRESV FREE SYRINGE IM [id number] CPT(R)] METABOLIC PANEL, COMPREHENSIVE [61493 CPT(R)] OCCULT BLOOD, IMMUNOASSAY (FIT) [id number] CPT(R)] Follow-up Instructions Return in about 1 year (around 10/26/2018) for physical.  
  
  
Patient Instructions Vaccine Information Statement Influenza (Flu) Vaccine (Inactivated or Recombinant): What you need to know Many Vaccine Information Statements are available in [place] and other languages. See www.immunize.org/vis Hojas de Información Sobre Vacunas están disponibles en Español y en muchos otros idiomas. Visite www.immunize.org/vis 1. Why get vaccinated? Influenza (flu) is a contagious disease that spreads around the [place] every year, usually between October and May. Flu is caused by influenza viruses, and is spread mainly by coughing, sneezing, and close contact. Anyone can get flu. Flu strikes suddenly and can last several days. Symptoms vary by age, but can include: 
 fever/chills  sore throat  muscle aches  fatigue  cough  headache  runny or stuffy nose Flu can also lead to pneumonia and blood infections, and cause diarrhea and seizures in children. If you have a medical condition, such as heart or lung disease, flu can make it worse. Flu is more dangerous for some people. Infants and young children, people 72years of age and older, pregnant women, and people with certain health conditions or a weakened immune system are at greatest risk. Each year thousands of people in the [facility] die from flu, and many more are hospitalized. Flu vaccine can: 
 keep you from getting flu, 
 make flu less severe if you do get it, and 
 keep you from spreading flu to your family and other people. 2. Inactivated and recombinant flu vaccines A dose of flu vaccine is recommended every flu season. Children 6 months through 6years of age may need two doses during the same flu season. Everyone else needs only one dose each flu season. Some inactivated flu vaccines contain a very small amount of a mercury-based preservative called thimerosal. Studies have not shown thimerosal in vaccines to be harmful, but flu vaccines that do not contain thimerosal are available. There is no live flu virus in flu shots. They cannot cause the flu. There are many flu viruses, and they are always changing. Each year a new flu vaccine is made to protect against three or four viruses that are likely to cause disease in the upcoming flu season. But even when the vaccine doesnt exactly match these viruses, it may still provide some protection Flu vaccine cannot prevent: 
 flu that is caused by a virus not covered by the vaccine, or 
 illnesses that look like flu but are not. It takes about 2 weeks for protection to develop after vaccination, and protection lasts through the flu season. 3. Some people should not get this vaccine Tell the person who is giving you the vaccine:  If you have any severe, life-threatening allergies. If you ever had a life-threatening allergic reaction after a dose of flu vaccine, or have a severe allergy to any part of this vaccine, you may be advised not to get vaccinated. Most, but not all, types of flu vaccine contain a small amount of egg protein.  If you ever had Guillain-Barré Syndrome (also called GBS). Some people with a history of GBS should not get this vaccine. This should be discussed with your doctor.  If you are not feeling well. It is usually okay to get flu vaccine when you have a mild illness, but you might be asked to come back when you feel better. 4. Risks of a vaccine reaction With any medicine, including vaccines, there is a chance of reactions. These are usually mild and go away on their own, but serious reactions are also possible. Most people who get a flu shot do not have any problems with it. Minor problems following a flu shot include:  
 soreness, redness, or swelling where the shot was given  hoarseness  sore, red or itchy eyes  cough  fever  aches  headache  itching  fatigue If these problems occur, they usually begin soon after the shot and last 1 or 2 days. More serious problems following a flu shot can include the following:  There may be a small increased risk of Guillain-Barré Syndrome (GBS) after inactivated flu vaccine. This risk has been estimated at 1 or 2 additional cases per million people vaccinated. This is much lower than the risk of severe complications from flu, which can be prevented by flu vaccine.  Young children who get the flu shot along with pneumococcal vaccine (PCV13) and/or DTaP vaccine at the same time might be slightly more likely to have a seizure caused by fever. Ask your doctor for more information. Tell your doctor if a child who is getting flu vaccine has ever had a seizure. Problems that could happen after any injected vaccine:  People sometimes faint after a medical procedure, including vaccination. Sitting or lying down for about 15 minutes can help prevent fainting, and injuries caused by a fall. Tell your doctor if you feel dizzy, or have vision changes or ringing in the ears.  Some people get severe pain in the shoulder and have difficulty moving the arm where a shot was given. This happens very rarely.  Any medication can cause a severe allergic reaction. Such reactions from a vaccine are very rare, estimated at about 1 in a million doses, and would happen within a few minutes to a few hours after the vaccination. As with any medicine, there is a very remote chance of a vaccine causing a serious injury or death. The safety of vaccines is always being monitored. For more information, visit: www.cdc.gov/vaccinesafety/ 
 
 
The [facility] Vaccine Injury Compensation Program (VICP) is a federal program that was created to compensate people who may have been injured by certain vaccines. Persons who believe they may have been injured by a vaccine can learn about the program and about filing a claim by calling 8-883.270.2021 or visiting the [employer] website at www.[facility].gov/vaccinecompensation. There is a time limit to file a claim for compensation. 7. How can I learn more?  Ask your healthcare provider. He or she can give you the vaccine package insert or suggest other sources of information.  Call your local or state health department.  Contact the Centers for Disease Control and Prevention (CDC): 
- Call 6-608.357.7597 (1-800-CDC-INFO) or 
- Visit CDCs website at www.cdc.gov/flu Vaccine Information Statement Inactivated Influenza Vaccine 8/7/2015 
42 JEFFSandra Stephnes Rafat [id number] [facility] and [employer] Centers for Disease Control and Prevention Office Use Only [facility]! Dear Christoper Gaucher: Thank you for requesting a [employer] account. Our records indicate that you already have an active [employer] account. You can access your account anytime at [email or web address] Did you know that you can access your hospital and ER discharge instructions at any time in [employer]? You can also review all of your test results from your hospital stay or ER visit. Additional Information If you have questions, please visit the Frequently Asked Questions section of the [employer] website at [email or web address]/. Remember, [employer] is NOT to be used for urgent needs. For medical emergencies, dial 911. Now available from your iPhone and Android! Please provide this summary of care documentation to your next provider. Your primary care clinician is listed as Teresita De Leon. If you have any questions after today's visit, please call [phone].

## 2017-10-27 LAB
ALBUMIN SERPL-MCNC: 4.2 G/DL (ref 3.5–5.5)
ALBUMIN/GLOB SERPL: 1.7 {RATIO} (ref 1.2–2.2)
ALP SERPL-CCNC: 60 IU/L (ref 39–117)
ALT SERPL-CCNC: 23 IU/L (ref 0–32)
AST SERPL-CCNC: 26 IU/L (ref 0–40)
BASOPHILS # BLD AUTO: 0.1 X10E3/UL (ref 0–0.2)
BASOPHILS NFR BLD AUTO: 2 %
BILIRUB SERPL-MCNC: 0.4 MG/DL (ref 0–1.2)
BUN SERPL-MCNC: 15 MG/DL (ref 6–24)
BUN/CREAT SERPL: 15 (ref 9–23)
CALCIUM SERPL-MCNC: 9.6 MG/DL (ref 8.7–10.2)
CHLORIDE SERPL-SCNC: 102 MMOL/L (ref 96–106)
CO2 SERPL-SCNC: 29 MMOL/L (ref 18–29)
CREAT SERPL-MCNC: 0.98 MG/DL (ref 0.57–1)
EOSINOPHIL # BLD AUTO: 0.2 X10E3/UL (ref 0–0.4)
EOSINOPHIL NFR BLD AUTO: 8 %
ERYTHROCYTE [DISTWIDTH] IN BLOOD BY AUTOMATED COUNT: 13 % (ref 12.3–15.4)
GFR SERPLBLD CREATININE-BSD FMLA CKD-EPI: 66 ML/MIN/1.73
GFR SERPLBLD CREATININE-BSD FMLA CKD-EPI: 76 ML/MIN/1.73
GLOBULIN SER CALC-MCNC: 2.5 G/DL (ref 1.5–4.5)
GLUCOSE SERPL-MCNC: 98 MG/DL (ref 65–99)
HCT VFR BLD AUTO: 37.3 % (ref 34–46.6)
HGB BLD-MCNC: 12.6 G/DL (ref 11.1–15.9)
IMM GRANULOCYTES # BLD: 0 X10E3/UL (ref 0–0.1)
IMM GRANULOCYTES NFR BLD: 0 %
LYMPHOCYTES # BLD AUTO: 1.1 X10E3/UL (ref 0.7–3.1)
LYMPHOCYTES NFR BLD AUTO: 46 %
MCH RBC QN AUTO: 30.5 PG (ref 26.6–33)
MCHC RBC AUTO-ENTMCNC: 33.8 G/DL (ref 31.5–35.7)
MCV RBC AUTO: 90 FL (ref 79–97)
MONOCYTES # BLD AUTO: 0.2 X10E3/UL (ref 0.1–0.9)
MONOCYTES NFR BLD AUTO: 10 %
MORPHOLOGY BLD-IMP: ABNORMAL
NEUTROPHILS # BLD AUTO: 0.8 X10E3/UL (ref 1.4–7)
NEUTROPHILS NFR BLD AUTO: 34 %
PLATELET # BLD AUTO: 259 X10E3/UL (ref 150–379)
POTASSIUM SERPL-SCNC: 4.4 MMOL/L (ref 3.5–5.2)
PROT SERPL-MCNC: 6.7 G/DL (ref 6–8.5)
RBC # BLD AUTO: 4.13 X10E6/UL (ref 3.77–5.28)
SODIUM SERPL-SCNC: 141 MMOL/L (ref 134–144)
WBC # BLD AUTO: 2.4 X10E3/UL (ref 3.4–10.8)

## 2017-10-29 ENCOUNTER — TELEPHONE (OUTPATIENT)
Dept: FAMILY MEDICINE CLINIC | Age: 52
End: 2017-10-29

## 2017-10-29 DIAGNOSIS — D70.9 NEUTROPENIA, UNSPECIFIED TYPE (HCC): Primary | ICD-10-CM

## 2017-10-29 NOTE — TELEPHONE ENCOUNTER
Please call patient and let her know her white blood cells are low. She should avoid crowds and sick people. Also, she should call right away if she develops a fever over 100.4. I am referring her to hematology and have printed a lab slip.

## 2017-10-30 NOTE — TELEPHONE ENCOUNTER
Called and spoke with pt, and she has been advised and states understanding of results and plan. Pt has been given referral information to call and schedule appointment and advised to call our office back with appointment date and time.

## 2017-11-02 LAB — HEMOCCULT STL QL IA: NEGATIVE

## 2017-11-17 ENCOUNTER — HOSPITAL ENCOUNTER (OUTPATIENT)
Dept: INFUSION THERAPY | Age: 52
Discharge: HOME | End: 2017-11-17
Payer: COMMERCIAL

## 2017-11-17 ENCOUNTER — OFFICE VISIT (OUTPATIENT)
Dept: ONCOLOGY | Age: 52
End: 2017-11-17

## 2017-11-17 VITALS
DIASTOLIC BLOOD PRESSURE: 83 MMHG | TEMPERATURE: 97.7 F | SYSTOLIC BLOOD PRESSURE: 120 MMHG | RESPIRATION RATE: 18 BRPM | HEART RATE: 64 BPM | OXYGEN SATURATION: 100 %

## 2017-11-17 VITALS
RESPIRATION RATE: 20 BRPM | OXYGEN SATURATION: 100 % | BODY MASS INDEX: 26.84 KG/M2 | TEMPERATURE: 96.5 F | SYSTOLIC BLOOD PRESSURE: 133 MMHG | HEIGHT: 66 IN | DIASTOLIC BLOOD PRESSURE: 89 MMHG | WEIGHT: 167 LBS | HEART RATE: 56 BPM

## 2017-11-17 DIAGNOSIS — D70.9 NEUTROPENIA, UNSPECIFIED TYPE (HCC): Primary | ICD-10-CM

## 2017-11-17 LAB
LDH SERPL L TO P-CCNC: 198 U/L (ref 81–246)
PERIPHERAL SMEAR,PSM: NORMAL
VIT B12 SERPL-MCNC: 606 PG/ML (ref 211–911)

## 2017-11-17 PROCEDURE — 86038 ANTINUCLEAR ANTIBODIES: CPT | Performed by: INTERNAL MEDICINE

## 2017-11-17 PROCEDURE — 83615 LACTATE (LD) (LDH) ENZYME: CPT | Performed by: INTERNAL MEDICINE

## 2017-11-17 PROCEDURE — 82747 ASSAY OF FOLIC ACID RBC: CPT | Performed by: INTERNAL MEDICINE

## 2017-11-17 PROCEDURE — 84155 ASSAY OF PROTEIN SERUM: CPT | Performed by: INTERNAL MEDICINE

## 2017-11-17 PROCEDURE — 87389 HIV-1 AG W/HIV-1&-2 AB AG IA: CPT | Performed by: INTERNAL MEDICINE

## 2017-11-17 PROCEDURE — 36415 COLL VENOUS BLD VENIPUNCTURE: CPT | Performed by: INTERNAL MEDICINE

## 2017-11-17 PROCEDURE — 36415 COLL VENOUS BLD VENIPUNCTURE: CPT

## 2017-11-17 PROCEDURE — 82607 VITAMIN B-12: CPT | Performed by: INTERNAL MEDICINE

## 2017-11-17 NOTE — PROGRESS NOTES
[facility] [place] at [facility]  [address] [facility], [address] [place] [address]  Alia Salasker: [phone]  F: [phone]      Reason for Visit:   Antony Franklin is a 45 y.o. female who is seen in consultation at the request of Dr. Samara Valdez for evaluation of leukopenia. History of Present Illness:   Antony Franklin is a pleasant 45 y.o. female who presents today for evaluation of leukopenia. She reports feeling well. Good energy. No pain. No fevers, chills, unintentional weight loss, adenopathy. No recurring infections, rarely gets sick. Mild night sweats, she attributes to menopause. She saw her PCP for a routine evaluation, and labwork demonstrated progressive neutropenia, for which she has been referred. She denies any family history of hematologic issues.       Past Medical History:   Diagnosis Date    Anemia     DVT (deep vein thrombosis) in pregnancy ([address]     2005    Family history of premature CAD 12/14/2016    GERD (gastroesophageal reflux disease)     H. pylori infection 7/2013    Meralgia paresthetica of left side 12/14/2016    Migraine with aura and without status migrainosus, not intractable 12/14/2016    PE (pulmonary embolism)     2005    Thromboembolus ([address]     mom did too      Past Surgical History:   Procedure Laterality Date    HX ANKLE FRACTURE TX  2005    left ankle-hardware    HX GYN      hysterectomy    HX ORTHOPAEDIC      Carpel Tunnel surgery    HX REFRACTIVE SURGERY        Social History   Substance Use Topics    Smoking status: Never Smoker    Smokeless tobacco: Never Used    Alcohol use 1.0 oz/week     1 Glasses of wine, 1 Shots of liquor per week      Comment: occ wine or madison      Family History   Problem Relation Age of Onset    Heart Disease Mother 50     MI    Hypertension Mother     Deep Vein Thrombosis Mother     Depression Mother     Heart Disease Father 72     MI    Hypertension Father     Seizures Father     Migraines Daughter     Attention Deficit Hyperactivity Disorder Daughter     Depression Daughter    [facility] Migraines Sister     Diabetes Sister     Stroke Sister     Cancer Brother      bone    Neuropathy Brother     No Known Problems Sister     No Known Problems Sister     Stroke Sister     Hypertension Sister     No Known Problems Brother     Cancer Brother     No Known Problems Brother     No Known Problems Son      Current Outpatient Prescriptions   Medication Sig    ZINC GLUCONATE (ZINC NATURAL PO) Take  by mouth daily.  chlorpheniramine-dm (CORICIDIN HBP COUGH AND COLD) 4-30 mg tab Take  by mouth.  MELATONIN (MELATIN PO) Take  by mouth nightly.  folic acid 801 mcg tablet Take 800 mcg by mouth daily.  multivitamin with iron (DAILY MULTI-VITAMINS/IRON) tablet Take 1 Tab by mouth daily.  cholecalciferol (VITAMIN D3) 1,000 unit tablet Take  by mouth daily.  Biotin 2,500 mcg cap Take  by mouth.  Glucosamine &Chondroit-MV-Min3 026-614-68-0.5 mg tab Take  by mouth.  VITAMIN E ACETATE (VITAMIN E PO) Take  by mouth.  cyanocobalamin (VITAMIN B12) 100 mcg tablet Take 100 mcg by mouth daily.  OM-3/DHA/EPA/FISH OIL/VIT D3 (FISH OIL-VIT D3 PO) Take  by mouth. No current facility-administered medications for this visit. No Known Allergies     Review of Systems: A complete review of systems was obtained, negative except as described above.     Physical Exam:     Visit Vitals    /89 (BP 1 Location: Left arm, BP Patient Position: Sitting)    Pulse (!) 56    Temp 96.5 °F (35.8 °C) (Temporal)    Resp 20    Ht 5' 6\" (1.676 m)    Wt 167 lb (75.8 kg)    SpO2 100%    BMI 26.95 kg/m2     General: No distress  Eyes: PERRLA, anicteric sclerae  HENT: Atraumatic, OP clear  Neck: Supple  Lymphatic: No cervical, supraclavicular, or inguinal adenopathy  Respiratory: CTAB, normal respiratory effort  CV: Normal rate, regular rhythm, no murmurs, no peripheral edema  GI: Soft, nontender, nondistended, no masses, no hepatomegaly, no splenomegaly  MS: Normal gait and station. Digits without clubbing or cyanosis. Skin: No rashes, ecchymoses, or petechiae. Normal temperature, turgor, and texture. Psych: Alert, oriented, appropriate affect, normal judgment/insight    Results:     Lab Results   Component Value Date/Time    WBC 2.4 10/26/2017 09:33 AM    HGB 12.6 10/26/2017 09:33 AM    HCT 37.3 10/26/2017 09:33 AM    PLATELET 840 43/32/0396 09:33 AM    MCV 90 10/26/2017 09:33 AM    ABS. NEUTROPHILS 0.8 10/26/2017 09:33 AM     Lab Results   Component Value Date/Time    Sodium 141 10/26/2017 09:33 AM    Potassium 4.4 10/26/2017 09:33 AM    Chloride 102 10/26/2017 09:33 AM    CO2 29 10/26/2017 09:33 AM    Glucose 98 10/26/2017 09:33 AM    BUN 15 10/26/2017 09:33 AM    Creatinine 0.98 10/26/2017 09:33 AM    GFR est AA 76 10/26/2017 09:33 AM    GFR est non-AA 66 10/26/2017 09:33 AM    Calcium 9.6 10/26/2017 09:33 AM     Lab Results   Component Value Date/Time    Bilirubin, total 0.4 10/26/2017 09:33 AM    ALT (SGPT) 23 10/26/2017 09:33 AM    AST (SGOT) 26 10/26/2017 09:33 AM    Alk. phosphatase 60 10/26/2017 09:33 AM    Protein, total 6.7 10/26/2017 09:33 AM    Albumin 4.2 10/26/2017 09:33 AM    Globulin 4.1 06/25/2013 03:00 PM     Lab Results   Component Value Date/Time    Sed rate (ESR) 12 08/09/2012 11:01 AM    C-Reactive Protein, Cardiac 0.33 08/09/2012 11:01 AM    TSH 2.250 08/25/2014 09:22 AM    Antinuclear Antibodies Direct Negative 08/09/2012 11:01 AM    Lipase 111 06/25/2013 03:00 PM    Hep C Virus Ab <0.1 02/15/2012 09:38 AM    HIV 1/O/2 Abs <1.00 08/25/2014 09:22 AM     ABS. NEUTROPHILS   Date Value   10/26/2017 0.8 x10E3/uL (L)   12/14/2016 1.0 x10E3/uL (L)   10/20/2015 2.1 x10E3/uL   08/25/2014 1.1 x10E3/uL (L)   06/25/2013 3.7 K/UL         Records reviewed and summarized above.     Assessment:   1) Neutropenia, moderate  Chronic since at least 2011, though progressive with ANC now at its lowest recorded value in our system. The most common etiology, especially in patients without recurrent infections, would be benign ethnic neutropenia, as s commonly have a lower WBC than Caucasians. There is no evidence by history of an inherited neutropenia, no history of chronic or recurrent infection, and no medication that is likely to be contributing. I will explore some other possibilities today with labwork. If these studies are negative and the neutrophil count remains <1000, then a bone marrow biopsy may be indicated. Plan:     · Labs today  · Return to see me in a few weeks to review results    I appreciate the opportunity to participate in Ms. Damion Weston's care.     Signed By: Jacques Kmi MD

## 2017-11-17 NOTE — PROGRESS NOTES
[facility] Lab Visit:    [id number]  Pt arrived ambulatory and in no distress, labs drawn . Departed [facility] ambulatory and in no distress. Visit Vitals    /83    Pulse 64    Temp 97.7 °F (36.5 °C)    Resp 18    SpO2 100%       Labs available in CC once resulted.

## 2017-11-18 LAB
ANA SER QL: NEGATIVE
SEE BELOW:, 164879: NORMAL

## 2017-11-20 LAB
ALBUMIN SERPL ELPH-MCNC: 4.4 G/DL (ref 2.9–4.4)
ALBUMIN/GLOB SERPL: 1.5 {RATIO} (ref 0.7–1.7)
ALPHA1 GLOB SERPL ELPH-MCNC: 0.2 G/DL (ref 0–0.4)
ALPHA2 GLOB SERPL ELPH-MCNC: 0.6 G/DL (ref 0.4–1)
B-GLOBULIN SERPL ELPH-MCNC: 1 G/DL (ref 0.7–1.3)
FOLATE BLD-MCNC: 428.5 NG/ML
FOLATE RBC-MCNC: 1055 NG/ML
GAMMA GLOB SERPL ELPH-MCNC: 1.1 G/DL (ref 0.4–1.8)
GLOBULIN SER CALC-MCNC: 3 G/DL (ref 2.2–3.9)
HCT VFR BLD AUTO: 40.6 % (ref 34–46.6)
HIV 1+2 AB+HIV1 P24 AG SERPL QL IA: NONREACTIVE
HIV12 RESULT COMMENT, HHIVC: NORMAL
M PROTEIN SERPL ELPH-MCNC: NORMAL G/DL
PROT SERPL-MCNC: 7.4 G/DL (ref 6–8.5)

## 2017-11-22 ENCOUNTER — HOSPITAL ENCOUNTER (OUTPATIENT)
Dept: INFUSION THERAPY | Age: 52
Discharge: HOME | End: 2017-11-22
Payer: COMMERCIAL

## 2017-11-22 VITALS
SYSTOLIC BLOOD PRESSURE: 127 MMHG | RESPIRATION RATE: 16 BRPM | TEMPERATURE: 98 F | DIASTOLIC BLOOD PRESSURE: 88 MMHG | HEART RATE: 62 BPM

## 2017-11-22 LAB
BASOPHILS # BLD: 0 K/UL (ref 0–0.1)
BASOPHILS NFR BLD: 1 % (ref 0–1)
CRP SERPL-MCNC: <0.29 MG/DL
EOSINOPHIL # BLD: 0.2 K/UL (ref 0–0.4)
EOSINOPHIL NFR BLD: 7 % (ref 0–7)
ERYTHROCYTE [DISTWIDTH] IN BLOOD BY AUTOMATED COUNT: 12 % (ref 11.5–14.5)
ERYTHROCYTE [SEDIMENTATION RATE] IN BLOOD: 10 MM/HR (ref 0–30)
HCT VFR BLD AUTO: 36.3 % (ref 35–47)
HGB BLD-MCNC: 12.4 G/DL (ref 11.5–16)
LYMPHOCYTES # BLD: 1.4 K/UL (ref 0.8–3.5)
LYMPHOCYTES NFR BLD: 47 % (ref 12–49)
MCH RBC QN AUTO: 31 PG (ref 26–34)
MCHC RBC AUTO-ENTMCNC: 34.2 G/DL (ref 30–36.5)
MCV RBC AUTO: 90.8 FL (ref 80–99)
MONOCYTES # BLD: 0.2 K/UL (ref 0–1)
MONOCYTES NFR BLD: 7 % (ref 5–13)
NEUTS SEG # BLD: 1.1 K/UL (ref 1.8–8)
NEUTS SEG NFR BLD: 38 % (ref 32–75)
PLATELET # BLD AUTO: 249 K/UL (ref 150–400)
RBC # BLD AUTO: 4 M/UL (ref 3.8–5.2)
WBC # BLD AUTO: 2.9 K/UL (ref 3.6–11)

## 2017-11-22 PROCEDURE — 85025 COMPLETE CBC W/AUTO DIFF WBC: CPT | Performed by: INTERNAL MEDICINE

## 2017-11-22 PROCEDURE — 36415 COLL VENOUS BLD VENIPUNCTURE: CPT | Performed by: INTERNAL MEDICINE

## 2017-11-22 PROCEDURE — 85652 RBC SED RATE AUTOMATED: CPT | Performed by: INTERNAL MEDICINE

## 2017-11-22 PROCEDURE — 86140 C-REACTIVE PROTEIN: CPT | Performed by: INTERNAL MEDICINE

## 2017-11-22 NOTE — PROGRESS NOTES
Blood pressure 127/88, pulse 62, temperature 98 °F (36.7 °C), resp. rate 16. Pt arrived at 0943,labs drawn peripherally from right [facility]. Pt tolerated well and left at 0951.

## 2017-11-30 ENCOUNTER — OFFICE VISIT (OUTPATIENT)
Dept: ONCOLOGY | Age: 52
End: 2017-11-30

## 2017-11-30 VITALS
HEIGHT: 66 IN | WEIGHT: 167.2 LBS | OXYGEN SATURATION: 100 % | RESPIRATION RATE: 16 BRPM | SYSTOLIC BLOOD PRESSURE: 121 MMHG | BODY MASS INDEX: 26.87 KG/M2 | HEART RATE: 68 BPM | TEMPERATURE: 97.4 F | DIASTOLIC BLOOD PRESSURE: 84 MMHG

## 2017-11-30 DIAGNOSIS — D70.9 NEUTROPENIA, UNSPECIFIED TYPE (HCC): Primary | ICD-10-CM

## 2017-11-30 NOTE — PROGRESS NOTES
Cancer [place] at [facility] [address]  [address], [address] [facility]  Almas Ma: [phone]  F: [phone]      Reason for Visit:   Flaquita Mittal is a 45 y.o. female who is seen for follow up of leukopenia. History of Present Illness:   Here today for follow up and lab results. She states she continues to feel well. Denies pain, fevers, chills, unintentional weight loss, adenopathy. No recent infections. Mild night sweats stable, she attributes to menopause. She has been donating platelets by pheresis every 2 weeks but has not donated in a month or two. She is unaccompanied today. PAST HISTORY: The following sections were reviewed and updated in the EMR as appropriate: PMH, SH, FH, Medications, Allergies. No Known Allergies   Review of Systems: A complete review of systems was obtained, reviewed, and scanned into the EMR. Pertinent findings reviewed above. Physical Exam:     Visit Vitals    /84 (BP 1 Location: Left arm, BP Patient Position: Sitting)    Pulse 68    Temp 97.4 °F (36.3 °C) (Oral)    Resp 16    Ht 5' 6\" (1.676 m)    Wt 167 lb 3.2 oz (75.8 kg)    SpO2 100%    BMI 26.99 kg/m2     General: No distress  Eyes: PERRLA, anicteric sclerae  HENT: Atraumatic, OP clear  Neck: Supple  Lymphatic: No cervical, supraclavicular, or inguinal adenopathy  Respiratory: CTAB, normal respiratory effort  CV: Normal rate, regular rhythm, no murmurs, no peripheral edema  GI: Soft, nontender, nondistended, no masses, no hepatomegaly, no splenomegaly  MS: Normal gait and station. Digits without clubbing or cyanosis. Skin: No rashes, ecchymoses, or petechiae. Normal temperature, turgor, and texture.   Psych: Alert, oriented, appropriate affect, normal judgment/insight    Results:     Lab Results   Component Value Date/Time    WBC 2.9 11/22/2017 09:46 AM    HGB 12.4 11/22/2017 09:46 AM    HCT 36.3 11/22/2017 09:46 AM    PLATELET 222 97/11/7431 09:46 AM    MCV 90.8 11/22/2017 09:46 AM    ABS. NEUTROPHILS 1.1 11/22/2017 09:46 AM     Lab Results   Component Value Date/Time    Sodium 141 10/26/2017 09:33 AM    Potassium 4.4 10/26/2017 09:33 AM    Chloride 102 10/26/2017 09:33 AM    CO2 29 10/26/2017 09:33 AM    Glucose 98 10/26/2017 09:33 AM    BUN 15 10/26/2017 09:33 AM    Creatinine 0.98 10/26/2017 09:33 AM    GFR est AA 76 10/26/2017 09:33 AM    GFR est non-AA 66 10/26/2017 09:33 AM    Calcium 9.6 10/26/2017 09:33 AM     Lab Results   Component Value Date/Time    Bilirubin, total 0.4 10/26/2017 09:33 AM    ALT (SGPT) 23 10/26/2017 09:33 AM    AST (SGOT) 26 10/26/2017 09:33 AM    Alk. phosphatase 60 10/26/2017 09:33 AM    Protein, total 7.4 11/17/2017 09:45 AM    Albumin 4.2 10/26/2017 09:33 AM    Globulin 4.1 06/25/2013 03:00 PM     Lab Results   Component Value Date/Time    Vitamin B12 606 11/17/2017 09:45 AM    Folate, RBC 1055 11/17/2017 09:45 AM     11/17/2017 09:45 AM    Sed rate (ESR) 12 08/09/2012 11:01 AM    Sed rate, automated 10 11/22/2017 09:46 AM    C-Reactive protein <0.29 11/22/2017 09:46 AM    C-Reactive Protein, Cardiac 0.33 08/09/2012 11:01 AM    TSH 2.250 08/25/2014 09:22 AM    M-spike Not Observed 11/17/2017 09:45 AM    JOSEPHINE Direct NEGATIVE  11/17/2017 09:45 AM    Antinuclear Antibodies Direct Negative 08/09/2012 11:01 AM    Lipase 111 06/25/2013 03:00 PM    Hep C Virus Ab <0.1 02/15/2012 09:38 AM    HIV 1/O/2 Abs <1.00 08/25/2014 09:22 AM     ABS. NEUTROPHILS   Date Value   11/22/2017 1.1 K/UL (L)   10/26/2017 0.8 x10E3/uL (L)   12/14/2016 1.0 x10E3/uL (L)   10/20/2015 2.1 x10E3/uL   08/25/2014 1.1 x10E3/uL (L)   06/25/2013 3.7 K/UL       Labs 11/17/2017:   SPEP normal  Peripheral smear: Normocytic normochromic anemia with mild stomatocytosis. Mild neutropenia. Few reactive lymphocytes. Unremarkable platelets.     Assessment:   1) Neutropenia, moderate  Chronic since at least 2011, though progressive with ANC recently at her lowest. Additional labs unremarkable and ANC improved to 1.1. 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 I also wonder if her frequent platelet pheresis could be contributing to neutropenia so I have asked her to hold off for now. We will recheck CBC again in a month to see if [address] continues to improve. With [address] >1, I think we can hold on bone marrow biopsy for now and monitor.       Plan:     · Labs in 1 month: CBC ([facility])  · Hold on further platelet donations until lab draw in one month  · Labs in 6 months: CBC ([facility])  · Return to clinic in 6 months      Signed By: Maria L Edwards MD

## 2017-11-30 NOTE — MR AVS SNAPSHOT
Visit Information Date & Time Provider Department Dept. Phone Encounter #  
 11/30/2017  8:15 AM Den Farnsworth NP [address] at [address] [place] [address] [id number] Follow-up Instructions Return in 6 months (on 5/30/2018) for [place] - neutropenia f/u. Upcoming Health Maintenance Date Due  
 PAP AKA CERVICAL CYTOLOGY 10/20/2018 BREAST CANCER SCRN MAMMOGRAM 1/20/2019 DTaP/Tdap/Td series (2 - Td) 1/20/2021 COLONOSCOPY 12/2/2023 Allergies as of 11/30/2017  Review Complete On: 11/30/2017 By: Den Farnsworth NP No Known Allergies Current Immunizations  Reviewed on 10/26/2017 Name Date Influenza Vaccine (Quad) PF 10/26/2017  8:39 AM, 12/14/2016 10:44 AM, 10/20/2015 Influenza Vaccine Split 10/1/2010 TDAP Vaccine 1/20/2011 Not reviewed this visit You Were Diagnosed With   
  
 Codes Comments Neutropenia, unspecified type ([facility]    -  Primary ICD-10-CM: D70.9 ICD-9-CM: 288.00 Vitals BP Pulse Temp Resp Height(growth percentile) Weight(growth percentile) 121/84 (BP 1 Location: Left arm, BP Patient Position: Sitting) 68 97.4 °F (36.3 °C) (Oral) 16 5' 6\" (1.676 m) 167 lb 3.2 oz (75.8 kg) SpO2 BMI OB Status Smoking Status 100% 26.99 kg/m2 Hysterectomy Never Smoker Vitals History BMI and BSA Data Body Mass Index Body Surface Area  
 26.99 kg/m 2 1.88 m 2 Preferred Pharmacy Pharmacy Name Phone [facility] [place], [address] [place] [facility] [phone] Your Updated Medication List  
  
   
This list is accurate as of: 11/30/17  9:08 AM.  Always use your most recent med list.  
  
  
  
  
 Biotin 2,500 mcg Cap Take  by mouth. CORICIDIN HBP COUGH AND COLD 4-30 mg Tab Generic drug:  chlorpheniramine-dm Take  by mouth.  
  
 cyanocobalamin 100 mcg tablet Commonly known as:  VITAMIN B12  
 Take 100 mcg by mouth daily. DAILY MULTI-VITAMINS/IRON tablet Generic drug:  multivitamin with iron Take 1 Tab by mouth daily. FISH OIL-VIT D3 PO Take  by mouth. folic acid 618 mcg tablet Take 800 mcg by mouth daily. Glucosamine &Chondroit-MV-Min3 076-796-99-0.5 mg Tab Take  by mouth. MELATIN PO Take  by mouth nightly. VITAMIN D3 1,000 unit tablet Generic drug:  cholecalciferol Take  by mouth daily. VITAMIN E PO Take  by mouth. ZINC NATURAL PO Take  by mouth daily. We Performed the Following CBC WITH AUTOMATED DIFF [33251 CPT(R)] CBC WITH AUTOMATED DIFF [57365 CPT(R)] Follow-up Instructions Return in 6 months (on 5/30/2018) for Dianne johnson f/u. To-Do List   
 01/22/2018 12:30 PM  
  Appointment with [facility] at [facility] ([phone]) Shower or bathe using soap and water. Do not use deodorant, powder, perfumes, or lotion the day of your exam.  If your prior mammograms were not performed at [facility] 6 please bring films with you or forward prior images 2 days before your procedure. Check in at registration 15min before your appointment time unless you were instructed to do otherwise. A script is not necessary, but if you have one, please bring it on the day of the mammogram or have it faxed to the department. [facility] [phone] [address] [place] [address]  [phone] [address]  [phone] [facility] [phone] [address] [phone] [facility] & HEALTH SERVICES! Dear Lenora Kaur: Thank you for requesting a [employer] account. Our records indicate that you already have an active [employer] account. You can access your account anytime at [email or web address] Did you know that you can access your hospital and ER discharge instructions at any time in [employer]? You can also review all of your test results from your hospital stay or ER visit. Additional Information If you have questions, please visit the Frequently Asked Questions section of the [employer] website at [email or web address] Remember, [email or web address] is NOT to be used for urgent needs. For medical emergencies, dial 911. Now available from your iPhone and Android! Please provide this summary of care documentation to your next provider. Your primary care clinician is listed as Yolanda Esparza. If you have any questions after today's visit, please call [phone].

## 2018-01-22 ENCOUNTER — HOSPITAL ENCOUNTER (OUTPATIENT)
Dept: MAMMOGRAPHY | Age: 53
Discharge: HOME | End: 2018-01-22
Attending: FAMILY MEDICINE
Payer: COMMERCIAL

## 2018-01-22 DIAGNOSIS — Z12.31 VISIT FOR SCREENING MAMMOGRAM: ICD-10-CM

## 2018-01-22 PROCEDURE — 77067 SCR MAMMO BI INCL CAD: CPT

## 2018-02-03 LAB
BASOPHILS # BLD AUTO: 0 X10E3/UL (ref 0–0.2)
BASOPHILS NFR BLD AUTO: 1 %
EOSINOPHIL # BLD AUTO: 0.2 X10E3/UL (ref 0–0.4)
EOSINOPHIL NFR BLD AUTO: 6 %
ERYTHROCYTE [DISTWIDTH] IN BLOOD BY AUTOMATED COUNT: 13.1 % (ref 12.3–15.4)
HCT VFR BLD AUTO: 40.2 % (ref 34–46.6)
HGB BLD-MCNC: 13.9 G/DL (ref 11.1–15.9)
IMM GRANULOCYTES # BLD: 0 X10E3/UL (ref 0–0.1)
IMM GRANULOCYTES NFR BLD: 0 %
LYMPHOCYTES # BLD AUTO: 1.5 X10E3/UL (ref 0.7–3.1)
LYMPHOCYTES NFR BLD AUTO: 42 %
MCH RBC QN AUTO: 30.9 PG (ref 26.6–33)
MCHC RBC AUTO-ENTMCNC: 34.6 G/DL (ref 31.5–35.7)
MCV RBC AUTO: 89 FL (ref 79–97)
MONOCYTES # BLD AUTO: 0.4 X10E3/UL (ref 0.1–0.9)
MONOCYTES NFR BLD AUTO: 10 %
NEUTROPHILS # BLD AUTO: 1.4 X10E3/UL (ref 1.4–7)
NEUTROPHILS NFR BLD AUTO: 41 %
PLATELET # BLD AUTO: 302 X10E3/UL (ref 150–379)
RBC # BLD AUTO: 4.5 X10E6/UL (ref 3.77–5.28)
WBC # BLD AUTO: 3.4 X10E3/UL (ref 3.4–10.8)

## 2018-02-13 NOTE — PROGRESS NOTES
02/13/18- Called pt unable to reach pt-left a v/m with results/ recommendations. Encouraged her to return phone call if any questions.

## 2018-05-17 ENCOUNTER — TELEPHONE (OUTPATIENT)
Dept: ONCOLOGY | Age: 53
End: 2018-05-17

## 2018-05-17 NOTE — TELEPHONE ENCOUNTER
[facility]  [phone]    05/17/18- Phone call placed to pt to remind pt to have labs drawn prior to her follow up appointment with .  left for patient.

## 2018-05-31 ENCOUNTER — TELEPHONE (OUTPATIENT)
Dept: ONCOLOGY | Age: 53
End: 2018-05-31

## 2018-06-05 ENCOUNTER — TELEPHONE (OUTPATIENT)
Dept: FAMILY MEDICINE CLINIC | Age: 53
End: 2018-06-05

## 2018-06-05 NOTE — TELEPHONE ENCOUNTER
Referral submitted to [facility] and per pt's plan, no referrals are needed for this pt. Faxed copy of confirmation of this from [facility] website and left message with Jennifer to advise Renata Gonzalez for pt's appointment tomorrow.  Jerson

## 2018-06-05 NOTE — TELEPHONE ENCOUNTER
Chong Rowe called from Dr. Ramsey Dean's office requesting [facility] insurance referral for pt's appointment tomorrow at 1:30 pm for evaluation of leukopenia per Dr. Archie Funez be faxed to [phone].  Dionym

## 2018-07-11 LAB
BASOPHILS # BLD AUTO: 0 X10E3/UL (ref 0–0.2)
BASOPHILS NFR BLD AUTO: 1 %
EOSINOPHIL # BLD AUTO: 0.2 X10E3/UL (ref 0–0.4)
EOSINOPHIL NFR BLD AUTO: 5 %
ERYTHROCYTE [DISTWIDTH] IN BLOOD BY AUTOMATED COUNT: 13 % (ref 12.3–15.4)
HCT VFR BLD AUTO: 38.6 % (ref 34–46.6)
HGB BLD-MCNC: 12.9 G/DL (ref 11.1–15.9)
IMM GRANULOCYTES # BLD: 0 X10E3/UL (ref 0–0.1)
IMM GRANULOCYTES NFR BLD: 0 %
LYMPHOCYTES # BLD AUTO: 1.7 X10E3/UL (ref 0.7–3.1)
LYMPHOCYTES NFR BLD AUTO: 44 %
MCH RBC QN AUTO: 30.5 PG (ref 26.6–33)
MCHC RBC AUTO-ENTMCNC: 33.4 G/DL (ref 31.5–35.7)
MCV RBC AUTO: 91 FL (ref 79–97)
MONOCYTES # BLD AUTO: 0.2 X10E3/UL (ref 0.1–0.9)
MONOCYTES NFR BLD AUTO: 5 %
NEUTROPHILS # BLD AUTO: 1.7 X10E3/UL (ref 1.4–7)
NEUTROPHILS NFR BLD AUTO: 45 %
PLATELET # BLD AUTO: 259 X10E3/UL (ref 150–379)
RBC # BLD AUTO: 4.23 X10E6/UL (ref 3.77–5.28)
WBC # BLD AUTO: 3.8 X10E3/UL (ref 3.4–10.8)

## 2018-07-16 ENCOUNTER — TELEPHONE (OUTPATIENT)
Dept: ONCOLOGY | Age: 53
End: 2018-07-16

## 2018-07-16 NOTE — TELEPHONE ENCOUNTER
Pt called and cancelled her appointment for tomorrow with Dr. Lisa Wynn because she stated she saw her results in [facility] and she does not wish to pay a copay to discuss results she can already see. No call back necessary.

## 2018-09-18 ENCOUNTER — TELEPHONE (OUTPATIENT)
Dept: FAMILY MEDICINE CLINIC | Age: 53
End: 2018-09-18

## 2018-09-18 ENCOUNTER — OFFICE VISIT (OUTPATIENT)
Dept: FAMILY MEDICINE CLINIC | Age: 53
End: 2018-09-18

## 2018-09-18 ENCOUNTER — HOSPITAL ENCOUNTER (OUTPATIENT)
Dept: GENERAL RADIOLOGY | Age: 53
Discharge: HOME | End: 2018-09-18
Attending: FAMILY MEDICINE
Payer: COMMERCIAL

## 2018-09-18 VITALS
SYSTOLIC BLOOD PRESSURE: 122 MMHG | OXYGEN SATURATION: 98 % | BODY MASS INDEX: 28.28 KG/M2 | HEIGHT: 66 IN | DIASTOLIC BLOOD PRESSURE: 76 MMHG | RESPIRATION RATE: 18 BRPM | WEIGHT: 176 LBS | HEART RATE: 55 BPM | TEMPERATURE: 98.2 F

## 2018-09-18 DIAGNOSIS — Z82.5 FAMILY HISTORY OF ASTHMA: ICD-10-CM

## 2018-09-18 DIAGNOSIS — R05.8 RECURRENT NONPRODUCTIVE COUGH: Primary | ICD-10-CM

## 2018-09-18 DIAGNOSIS — R05.8 RECURRENT NONPRODUCTIVE COUGH: ICD-10-CM

## 2018-09-18 PROCEDURE — 71046 X-RAY EXAM CHEST 2 VIEWS: CPT

## 2018-09-18 RX ORDER — BENZONATATE 200 MG/1
200 CAPSULE ORAL
Qty: 30 CAP | Refills: 0 | Status: SHIPPED | OUTPATIENT
Start: 2018-09-18 | End: 2019-03-01

## 2018-09-18 NOTE — MR AVS SNAPSHOT
[address] [facility] [address] [facility] 
[phone] Patient: Nuno Priest MRN: [id number] QOH:[id number] Visit Information Date & Time Provider Department Dept. Phone Encounter #  
 9/18/2018 10:30 AM Josse Lopez[facility] [address] [id number] Upcoming Health Maintenance Date Due Influenza Age 5 to Adult 8/1/2018 PAP AKA CERVICAL CYTOLOGY 10/20/2018 BREAST CANCER SCRN MAMMOGRAM 1/22/2020 DTaP/Tdap/Td series (2 - Td) 1/20/2021 COLONOSCOPY 12/2/2023 Allergies as of 9/18/2018  Review Complete On: 9/18/2018 By: Josse Lopez MD  
 No Known Allergies Current Immunizations  Reviewed on 10/26/2017 Name Date Influenza Vaccine (Quad) PF 10/26/2017  8:39 AM, 12/14/2016 10:44 AM, 10/20/2015 Influenza Vaccine Split 10/1/2010 TDAP Vaccine 1/20/2011 Not reviewed this visit You Were Diagnosed With   
  
 Codes Comments Recurrent nonproductive cough    -  Primary ICD-10-CM: R05 ICD-9-CM: 786.2 Family history of asthma     ICD-10-CM: Z82.5 ICD-9-CM: V17.5 Vitals BP Pulse Temp Resp Height(growth percentile) Weight(growth percentile) 122/76 (!) 55 98.2 °F (36.8 °C) (Oral) 18 5' 6\" (1.676 m) 176 lb (79.8 kg) SpO2 BMI OB Status Smoking Status 98% 28.41 kg/m2 Hysterectomy Never Smoker Vitals History BMI and BSA Data Body Mass Index Body Surface Area  
 28.41 kg/m 2 1.93 m 2 Preferred Pharmacy Pharmacy Name Phone [facility] [place], [address] [place] [facility] [phone] Your Updated Medication List  
  
   
This list is accurate as of 9/18/18 11:45 AM.  Always use your most recent med list.  
  
  
  
  
 benzonatate 200 mg capsule Commonly known as:  TESSALON Take 1 Cap by mouth three (3) times daily as needed for Cough. Biotin 2,500 mcg Cap Take  by mouth.  
  
 cyanocobalamin 100 mcg tablet Commonly known as:  VITAMIN B12 Take 100 mcg by mouth daily. FISH OIL-VIT D3 PO Take  by mouth. folic acid 413 mcg tablet Take 800 mcg by mouth daily. MELATIN PO Take  by mouth nightly. VITAMIN D3 1,000 unit tablet Generic drug:  cholecalciferol Take  by mouth daily. VITAMIN E PO Take  by mouth. ZINC NATURAL PO Take  by mouth daily. Prescriptions Sent to Pharmacy Refills  
 benzonatate (TESSALON) 200 mg capsule 0 Sig: Take 1 Cap by mouth three (3) times daily as needed for Cough. Class: Normal  
 Pharmacy: [facility] [address] [facility] #: [phone] Route: Oral  
  
To-Do List   
 09/18/2018 Imaging:  XR CHEST PA LAT Around 09/19/2018 PFT:  PULMONARY FUNCTION TEST Introducing [facility] SERVICES! Dear Velma Valdez: Thank you for requesting a [email or web address] account. Our records indicate that you already have an active [email or web address] account. You can access your account anytime at [email or web address] Did you know that you can access your hospital and ER discharge instructions at any time in [email or web address]? You can also review all of your test results from your hospital stay or ER visit. Additional Information If you have questions, please visit the Frequently Asked Questions section of the [email or web address] website at [email or web address] Remember, [email or web address] is NOT to be used for urgent needs. For medical emergencies, dial 911. Now available from your iPhone and Android! Please provide this summary of care documentation to your next provider. Your primary care clinician is listed as Lanny Valenzuela. If you have any questions after today's visit, please call [phone].

## 2018-09-18 NOTE — PROGRESS NOTES
HISTORY OF PRESENT ILLNESS  Tiffanie Mari is a 52 y.o. female. HPI Comments: Tiffanie Mari is her for a non-productive cough that started after returning from [place] two weeks ago. It is getting worse and is now making her throat sore. Denies head congestion or any other symptoms. Nothing makes it worse and nothing makes it better. She has seasonal bronchitis in the early fall. No history of smoking. Her daughter has eczema and asthma. Also, her sister possibly has asthma. Review of Systems   Constitutional: Negative for chills, fever and malaise/fatigue. HENT: Positive for sore throat. Negative for congestion, ear pain and sinus pain. Some postnasal drainage, but she has cough even when this is not present. Eyes: Negative for discharge and redness. Respiratory: Positive for cough and shortness of breath. Negative for sputum production and wheezing. Upper chest tightness. Cardiovascular: Positive for chest pain. Mild chest pain with coughing   Neurological: Negative for headaches. Visit Vitals    /76    Pulse (!) 55    Temp 98.2 °F (36.8 °C) (Oral)    Resp 18    Ht 5' 6\" (1.676 m)    Wt 176 lb (79.8 kg)    SpO2 98%    BMI 28.41 kg/m2     Physical Exam   Constitutional: She is oriented to person, place, and time. She appears well-developed and well-nourished. No distress. HENT:   Head: Normocephalic. Right Ear: Tympanic membrane, external ear and ear canal normal.   Left Ear: Tympanic membrane, external ear and ear canal normal.   Nose: Nose normal. Right sinus exhibits no maxillary sinus tenderness and no frontal sinus tenderness. Left sinus exhibits no maxillary sinus tenderness and no frontal sinus tenderness. Mouth/Throat: Uvula is midline, oropharynx is clear and moist and mucous membranes are normal.   Eyes: Right eye exhibits no discharge. Left eye exhibits no discharge. Right conjunctiva is not injected.  Left conjunctiva is not injected. Cardiovascular: Normal rate, regular rhythm and normal heart sounds. Exam reveals no gallop and no friction rub. No murmur heard. Pulmonary/Chest: Effort normal and breath sounds normal. No respiratory distress. She has no wheezes. She has no rales. Lymphadenopathy:     She has no cervical adenopathy. Neurological: She is alert and oriented to person, place, and time. Skin: Skin is warm and dry. She is not diaphoretic. Nursing note and vitals reviewed. ASSESSMENT and PLAN    ICD-10-CM ICD-9-CM    1. Recurrent nonproductive cough R05 786.2 XR CHEST PA LAT      PULMONARY FUNCTION TEST      benzonatate (TESSALON) 200 mg capsule   2. Family history of asthma Z82.5 V17.5 XR CHEST PA LAT      PULMONARY FUNCTION TEST        Seasonal cough, family history of asthma, consider reactive airway disease  chest X-ray  pulmonary function tests  Tessalon prn    Follow-up Disposition:  Return if not better in 2 weeks. Reviewed plan of care. Patient has provided input and agrees with goals.

## 2018-09-18 NOTE — PROGRESS NOTES
Chief Complaint   Patient presents with    Cough     chest congestion x 2 wks      1. Have you been to the ER, urgent care clinic since your last visit? Yes Urgent Care  Hospitalized since your last visit? No     2. Have you seen or consulted any other health care providers outside of the [address] [place] [address] since your last visit? Include any pap smears or colon screening.  No

## 2018-09-27 ENCOUNTER — HOSPITAL ENCOUNTER (OUTPATIENT)
Dept: PULMONOLOGY | Age: 53
Discharge: HOME | End: 2018-09-27
Attending: FAMILY MEDICINE
Payer: COMMERCIAL

## 2018-09-27 DIAGNOSIS — Z82.5 FAMILY HISTORY OF ASTHMA: ICD-10-CM

## 2018-09-27 DIAGNOSIS — R05.8 RECURRENT NONPRODUCTIVE COUGH: ICD-10-CM

## 2018-09-27 PROCEDURE — 94375 RESPIRATORY FLOW VOLUME LOOP: CPT

## 2018-09-27 PROCEDURE — 94726 PLETHYSMOGRAPHY LUNG VOLUMES: CPT

## 2018-09-27 PROCEDURE — 94729 DIFFUSING CAPACITY: CPT

## 2018-09-27 RX ORDER — ALBUTEROL SULFATE 0.83 MG/ML
2.5 SOLUTION RESPIRATORY (INHALATION)
Status: DISCONTINUED | OUTPATIENT
Start: 2018-09-27 | End: 2018-09-27

## 2018-10-06 NOTE — PROCEDURES
Mustapha Null [facility] 77  PULMONARY FUNCTION    Leslie Cueto  MR#: [id number]  : 1965  ACCOUNT #: [de-identified]   DATE OF SERVICE: 2018    FEV1 to FVC ratio 81 with an FEV1 of 117%. Total lung capacity 104%. Patient has normal spirometry. Normal lung volumes. Normal diffusion capacity. Normal airway resistance. Spirometry looks within normal limits as well.   It is a normal pulmonary function test.      Saniya Garcia MD       CCB / LN  D: 10/05/2018 17:05     T: 10/06/2018 05:05  JOB #: [id number]

## 2019-02-08 ENCOUNTER — HOSPITAL ENCOUNTER (OUTPATIENT)
Dept: MAMMOGRAPHY | Age: 54
Discharge: HOME | End: 2019-02-08
Attending: FAMILY MEDICINE
Payer: COMMERCIAL

## 2019-02-08 ENCOUNTER — OFFICE VISIT (OUTPATIENT)
Dept: FAMILY MEDICINE CLINIC | Age: 54
End: 2019-02-08

## 2019-02-08 VITALS
WEIGHT: 179 LBS | RESPIRATION RATE: 18 BRPM | HEIGHT: 66 IN | SYSTOLIC BLOOD PRESSURE: 132 MMHG | DIASTOLIC BLOOD PRESSURE: 88 MMHG | HEART RATE: 72 BPM | BODY MASS INDEX: 28.77 KG/M2 | TEMPERATURE: 97.9 F

## 2019-02-08 DIAGNOSIS — Z00.00 ROUTINE GENERAL MEDICAL EXAMINATION AT A HEALTH CARE FACILITY: Primary | ICD-10-CM

## 2019-02-08 DIAGNOSIS — Z12.31 VISIT FOR SCREENING MAMMOGRAM: ICD-10-CM

## 2019-02-08 PROCEDURE — 77067 SCR MAMMO BI INCL CAD: CPT

## 2019-02-08 RX ORDER — LANOLIN ALCOHOL/MO/W.PET/CERES
400 CREAM (GRAM) TOPICAL DAILY
COMMUNITY

## 2019-02-08 RX ORDER — PERPHENAZINE/AMITRIPTYLINE HCL 2 MG-10 MG
TABLET ORAL
COMMUNITY

## 2019-02-08 NOTE — PROGRESS NOTES
Subjective: Basilio Iyer is a 52 y.o. female here for annual physical exam.  
 
 
Health Habits. Lifestyle: Occupation:   
Household members:  1, patient Last dental appointment:   Last week Last eye exam:  This past fall Uses seatbelts regularly :  yes Getting regular exercise:  yes Last colonoscopy:   12/2/13 Last mammogram:  Today Patient Active Problem List  
Diagnosis Code  PE (pulmonary embolism) I26.99  
 Ankle fracture S82.899A  S/P MIKY-BSO Z90.710, Z90.722, Z90.79  GERD (gastroesophageal reflux disease) K21.9  History of normal resting EKG [id number]  S/P colonoscopy R07.729  Migraine with aura and without status migrainosus, not intractable G43. [facility]  Meralgia paresthetica of left side G57.12  Family history of premature CAD Z80.55 Past Medical History:  
Diagnosis Date  Anemia  DVT (deep vein thrombosis) in pregnancy ([facility] [address] 2005  Family history of premature CAD 12/14/2016  GERD (gastroesophageal reflux disease)  H. pylori infection 7/2013  Meralgia paresthetica of left side 12/14/2016  Migraine with aura and without status migrainosus, not intractable 12/14/2016  PE (pulmonary embolism) 2005  Thromboembolus ([facility] [address]   
 mom did too Past Surgical History:  
Procedure Laterality Date  HX ANKLE FRACTURE [place]  2005  
 left ankle-hardware  HX GYN    
 hysterectomy  HX ORTHOPAEDIC Carpel Tunnel surgery  HX REFRACTIVE SURGERY Family History Problem Relation Age of Onset  Heart Disease Mother 50 MI  
 Hypertension Mother  Deep Vein Thrombosis Mother  Depression Mother  Heart Disease Father 72 MI  
 Hypertension Father  Seizures Father  Migraines Daughter  Attention Deficit Hyperactivity Disorder Daughter  Depression Daughter  Migraines Sister  Diabetes Sister  Stroke Sister  Breast Cancer Sister  Cancer Brother   
     bone  Neuropathy Brother  No Known Problems Sister  No Known Problems Sister  Stroke Sister  Hypertension Sister  No Known Problems Brother  Cancer Brother  No Known Problems Brother  No Known Problems Son   
 
Social History Tobacco Use  Smoking status: Never Smoker  Smokeless tobacco: Never Used Substance Use Topics  Alcohol use: Yes Alcohol/week: 1.0 oz Types: 1 Glasses of wine, 1 Shots of liquor per week Comment: occ wine or [place] No Known Allergies Current Outpatient Medications Medication Sig Dispense Refill  benzonatate (TESSALON) 200 mg capsule Take 1 Cap by mouth three (3) times daily as needed for Cough. 30 Cap 0  
 ZINC GLUCONATE (ZINC NATURAL PO) Take  by mouth daily.  MELATONIN (MELATIN PO) Take  by mouth nightly.  folic acid 704 mcg tablet Take 800 mcg by mouth daily.  cholecalciferol (VITAMIN D3) 1,000 unit tablet Take  by mouth daily.  Biotin 2,500 mcg cap Take  by mouth.  VITAMIN E ACETATE (VITAMIN E PO) Take  by mouth.  cyanocobalamin (VITAMIN B12) 100 mcg tablet Take 100 mcg by mouth daily.  OM-3/DHA/EPA/FISH OIL/VIT D3 (FISH OIL-VIT D3 PO) Take  by mouth. Review of Systems A comprehensive review of systems was negative. Objective: 
Visit Vitals /88 Pulse 72 Temp 97.9 °F (36.6 °C) (Oral) Resp 18 Ht 5' 6\" (1.676 m) Wt 179 lb (81.2 kg) BMI 28.89 kg/m² Physical Examination:  
General appearance - alert, well appearing, and in no distress Mental status - alert, oriented to person, place, and time, normal mood, behavior, speech, dress, motor activity, and thought processes Eyes - pupils equal and reactive, extraocular eye movements intact, sclera anicteric Ears - bilateral TM's and external ear canals normal 
Nose - normal and patent, no erythema, discharge or polyps Mouth - mucous membranes moist, pharynx normal without lesions and dental hygiene good Neck - supple, no significant adenopathy, carotids upstroke normal bilaterally, no bruits, thyroid exam: thyroid is normal in size without nodules or tenderness Lymphatics - no palpable lymphadenopathy, no hepatosplenomegaly Chest - clear to auscultation, no wheezes, rales or rhonchi, symmetric air entry Heart - normal rate, regular rhythm, normal S1, S2, no murmurs, rubs, clicks or gallops Abdomen - soft, nontender, nondistended, no masses or organomegaly 
bowel sounds normal 
Breasts - breasts appear normal, no suspicious masses, no skin or nipple changes or axillary nodes Pelvic - examination not indicated Rectal - Kit for FOBT testing given to patient Neurological - alert, oriented, normal speech, no focal findings or movement disorder noted Musculoskeletal - normal gait Extremities - peripheral pulses normal, no pedal edema, no clubbing or cyanosis Skin - normal coloration and turgor, no rashes, no suspicious skin lesions noted Assessment/Plan: ICD-10-CM ICD-9-CM 1. Routine general medical examination at a [facility] care facility V24.72 E11.7 METABOLIC PANEL, COMPREHENSIVE  
   LIPID PANEL  
   CBC WITH AUTOMATED DIFF  
   OCCULT BLOOD IMMUNOASSAY,DIAGNOSTIC Breast awareness Labs per orders. Follow-up Disposition: 
Return in about 1 year (around 2/8/2020) for physical. 
 
 
Reviewed plan of care. Patient has provided input and agrees with goals.

## 2019-02-09 LAB
ALBUMIN SERPL-MCNC: 4.6 G/DL (ref 3.5–5.5)
ALBUMIN/GLOB SERPL: 1.8 {RATIO} (ref 1.2–2.2)
ALP SERPL-CCNC: 63 IU/L (ref 39–117)
ALT SERPL-CCNC: 50 IU/L (ref 0–32)
AST SERPL-CCNC: 120 IU/L (ref 0–40)
BASOPHILS # BLD AUTO: 0 X10E3/UL (ref 0–0.2)
BASOPHILS NFR BLD AUTO: 1 %
BILIRUB SERPL-MCNC: 0.4 MG/DL (ref 0–1.2)
BUN SERPL-MCNC: 18 MG/DL (ref 6–24)
BUN/CREAT SERPL: 17 (ref 9–23)
CALCIUM SERPL-MCNC: 9.6 MG/DL (ref 8.7–10.2)
CHLORIDE SERPL-SCNC: 102 MMOL/L (ref 96–106)
CHOLEST SERPL-MCNC: 181 MG/DL (ref 100–199)
CO2 SERPL-SCNC: 24 MMOL/L (ref 20–29)
CREAT SERPL-MCNC: 1.04 MG/DL (ref 0.57–1)
EOSINOPHIL # BLD AUTO: 0.2 X10E3/UL (ref 0–0.4)
EOSINOPHIL NFR BLD AUTO: 7 %
ERYTHROCYTE [DISTWIDTH] IN BLOOD BY AUTOMATED COUNT: 13 % (ref 12.3–15.4)
GLOBULIN SER CALC-MCNC: 2.6 G/DL (ref 1.5–4.5)
GLUCOSE SERPL-MCNC: 92 MG/DL (ref 65–99)
HCT VFR BLD AUTO: 38.3 % (ref 34–46.6)
HDLC SERPL-MCNC: 69 MG/DL
HGB BLD-MCNC: 13.4 G/DL (ref 11.1–15.9)
IMM GRANULOCYTES # BLD AUTO: 0 X10E3/UL (ref 0–0.1)
IMM GRANULOCYTES NFR BLD AUTO: 0 %
INTERPRETATION, 910389: NORMAL
LDLC SERPL CALC-MCNC: 103 MG/DL (ref 0–99)
LYMPHOCYTES # BLD AUTO: 1.3 X10E3/UL (ref 0.7–3.1)
LYMPHOCYTES NFR BLD AUTO: 47 %
MCH RBC QN AUTO: 31.2 PG (ref 26.6–33)
MCHC RBC AUTO-ENTMCNC: 35 G/DL (ref 31.5–35.7)
MCV RBC AUTO: 89 FL (ref 79–97)
MONOCYTES # BLD AUTO: 0.2 X10E3/UL (ref 0.1–0.9)
MONOCYTES NFR BLD AUTO: 7 %
NEUTROPHILS # BLD AUTO: 1.1 X10E3/UL (ref 1.4–7)
NEUTROPHILS NFR BLD AUTO: 38 %
PLATELET # BLD AUTO: 256 X10E3/UL (ref 150–379)
POTASSIUM SERPL-SCNC: 4.5 MMOL/L (ref 3.5–5.2)
PROT SERPL-MCNC: 7.2 G/DL (ref 6–8.5)
RBC # BLD AUTO: 4.29 X10E6/UL (ref 3.77–5.28)
SODIUM SERPL-SCNC: 142 MMOL/L (ref 134–144)
TRIGL SERPL-MCNC: 47 MG/DL (ref 0–149)
VLDLC SERPL CALC-MCNC: 9 MG/DL (ref 5–40)
WBC # BLD AUTO: 2.9 X10E3/UL (ref 3.4–10.8)

## 2019-02-11 ENCOUNTER — TELEPHONE (OUTPATIENT)
Dept: FAMILY MEDICINE CLINIC | Age: 54
End: 2019-02-11

## 2019-02-11 NOTE — LETTER
2/25/2019 8:12 AM 
 
Ms. Souleymane Elizabeth [address] [place] [address] [place] [id number] Dear Ms. Allyssa Sandhu, We have been unable to reach you by phone to notify you of your test results. Please call our office at [phone] and ask to speak with my nurse in order to explain these results to you and advise you of any recommendations.  
 
 
 
Sincerely, 
 
 
Carrol Rushing MD

## 2019-02-16 LAB
HEMOCCULT STL QL IA: NEGATIVE
SPECIMEN STATUS REPORT, ROLRST: NORMAL

## 2019-02-25 NOTE — TELEPHONE ENCOUNTER
Pt called office back and has been advised and states understanding of results. Pt gave the following responses. 1. No   3 52years old  2.14 years old  2.18 years old  5.1/2 sister had breast cancer   6. No  7.        Pt asks to be called back on cellular phone [phone]

## 2019-02-25 NOTE — TELEPHONE ENCOUNTER
Called pt, and left a voice message, asking that he/she call the office back in regard to his/her recent lab/test results. A letter has been mailed to pt, requesting she call office back.

## 2019-03-01 ENCOUNTER — OFFICE VISIT (OUTPATIENT)
Dept: FAMILY MEDICINE CLINIC | Age: 54
End: 2019-03-01

## 2019-03-01 VITALS
BODY MASS INDEX: 30.05 KG/M2 | DIASTOLIC BLOOD PRESSURE: 87 MMHG | HEART RATE: 51 BPM | TEMPERATURE: 98 F | SYSTOLIC BLOOD PRESSURE: 147 MMHG | HEIGHT: 66 IN | RESPIRATION RATE: 18 BRPM | WEIGHT: 187 LBS

## 2019-03-01 DIAGNOSIS — H69.83 DYSFUNCTION OF BOTH EUSTACHIAN TUBES: Primary | ICD-10-CM

## 2019-03-01 DIAGNOSIS — J06.9 VIRAL UPPER RESPIRATORY TRACT INFECTION: ICD-10-CM

## 2019-03-01 NOTE — PROGRESS NOTES
Chief Complaint   Patient presents with    Ear Pain     bilateral x 2 days     1. Have you been to the ER, urgent care clinic since your last visit? Hospitalized since your last visit? No     2. Have you seen or consulted any other health care providers outside of the [address] [place] [address] since your last visit? Include any pap smears or colon screening.  No

## 2019-03-01 NOTE — PROGRESS NOTES
HISTORY OF PRESENT ILLNESS  Lucas Kiser is a 52 y.o. female. Lucas Kiser presents with bilateral ear pain for the past 2 days. It is getting worse. She has had URI symptoms for about 5 days. Nothing makes the pain better or worse. It is like she is underwater. Review of Systems   Constitutional: Positive for malaise/fatigue. Negative for chills and fever. HENT: Positive for congestion, ear pain and sore throat. Mucous is clear in color. There is no sinus pain. Eyes: Negative for discharge and redness. Respiratory: Positive for cough. Negative for sputum production, shortness of breath and wheezing. Cardiovascular: Negative for chest pain. Neurological: Positive for headaches. Visit Vitals  /87   Pulse (!) 51   Temp 98 °F (36.7 °C) (Oral)   Resp 18   Ht 5' 6\" (1.676 m)   Wt 187 lb (84.8 kg)   BMI 30.18 kg/m²     BP Readings from Last 3 Encounters:   03/01/19 147/87   02/08/19 132/88   09/18/18 122/76     Physical Exam   Constitutional: She is oriented to person, place, and time. She appears well-developed and well-nourished. No distress. HENT:   Head: Normocephalic. Right Ear: External ear and ear canal normal. Tympanic membrane is retracted. Tympanic membrane is not erythematous. Tympanic membrane mobility is normal. No middle ear effusion. Left Ear: External ear and ear canal normal. Tympanic membrane is retracted. Tympanic membrane is not erythematous. Tympanic membrane mobility is abnormal.  No middle ear effusion. Nose: Nose normal. Right sinus exhibits no maxillary sinus tenderness and no frontal sinus tenderness. Left sinus exhibits no maxillary sinus tenderness and no frontal sinus tenderness. Mouth/Throat: Uvula is midline, oropharynx is clear and moist and mucous membranes are normal.   Eyes: Right eye exhibits no discharge. Left eye exhibits no discharge. Right conjunctiva is not injected. Left conjunctiva is not injected. Cardiovascular: Normal rate, regular rhythm and normal heart sounds. Exam reveals no gallop and no friction rub. No murmur heard. Pulmonary/Chest: Effort normal and breath sounds normal. No respiratory distress. She has no wheezes. She has no rales. Lymphadenopathy:     She has no cervical adenopathy. Neurological: She is alert and oriented to person, place, and time. Skin: Skin is warm and dry. She is not diaphoretic. Nursing note and vitals reviewed. ASSESSMENT and PLAN    ICD-10-CM ICD-9-CM    1. Dysfunction of both eustachian tubes H69.83 381.81 chlorpheniramine-dm (CORICIDIN HBP COUGH AND COLD) 4-30 mg tab   2. Viral upper respiratory tract infection J06.9 465.9 chlorpheniramine-dm (CORICIDIN HBP COUGH AND COLD) 4-30 mg tab        Rest, push fluids  Ear valsalva prn  Coricidin HBP prn    Follow-up Disposition:  Return if not better in 3-4 weeks. Reviewed plan of care. Patient has provided input and agrees with goals.

## 2019-03-01 NOTE — PATIENT INSTRUCTIONS
Eustachian Tube Problems: Care Instructions  Your Care Instructions    The eustachian (say \"you-STAY-shee-un\") tubes run between the inside of the ears and the throat. They keep air pressure stable in the ears. If your eustachian tubes become blocked, the air pressure in your ears changes. The fluids from a cold can clog eustachian tubes, causing pain in the ears. A quick change in air pressure can cause eustachian tubes to close up. This might happen when an airplane changes altitude or when a  goes up or down underwater. Eustachian tube problems often clear up on their own or after antibiotic treatment. If your tubes continue to be blocked, you may need surgery. Follow-up care is a key part of your treatment and safety. Be sure to make and go to all appointments, and call your doctor if you are having problems. It's also a good idea to know your test results and keep a list of the medicines you take. How can you care for yourself at home? · To ease ear pain, apply a warm washcloth or a heating pad set on low. There may be some drainage from the ear when the heat melts earwax. Put a cloth between the heat source and your skin. Do not use a heating pad with children. · If your doctor prescribed antibiotics, take them as directed. Do not stop taking them just because you feel better. You need to take the full course of antibiotics. · Your doctor may recommend over-the-counter medicine. Be safe with medicines. Oral or nasal decongestants may relieve ear pain. Avoid decongestants that are combined with antihistamines, which tend to cause more blockage. But if allergies seem to be the problem, your doctor may recommend a combination. Be careful with cough and cold medicines. Don't give them to children younger than 6, because they don't work for children that age and can even be harmful. For children 6 and older, always follow all the instructions carefully.  Make sure you know how much medicine to give and how long to use it. And use the dosing device if one is included. When should you call for help? Call your doctor now or seek immediate medical care if:    · You develop sudden, complete hearing loss.     · You have severe pain or feel dizzy.     · You have new or increasing pus or blood draining from your ear.     · You have redness, swelling, or pain around or behind the ear.    Watch closely for changes in your health, and be sure to contact your doctor if:    · You do not get better after 2 weeks.     · You have any new symptoms, such as itching or a feeling of fullness in the ear. Where can you learn more? Go to http://trixie-carlos manuel.info/. Enter Y822 in the search box to learn more about \"Eustachian Tube Problems: Care Instructions. \"  Current as of: March 27, 2018  Content Version: 11.9  © 7421-5947 [employer]. Care instructions adapted under license by [employer] (which disclaims liability or warranty for this information). If you have questions about a medical condition or this instruction, always ask your healthcare professional. [address] any warranty or liability for your use of this information. Eustachian Tube Problems: Care Instructions  Your Care Instructions    The eustachian (say \"you-STAY-shee-un\") tubes run between the inside of the ears and the throat. They keep air pressure stable in the ears. If your eustachian tubes become blocked, the air pressure in your ears changes. The fluids from a cold can clog eustachian tubes, causing pain in the ears. A quick change in air pressure can cause eustachian tubes to close up. This might happen when an airplane changes altitude or when a  goes up or down underwater. Eustachian tube problems often clear up on their own or after antibiotic treatment. If your tubes continue to be blocked, you may need surgery.   Follow-up care is a key part of your treatment and safety. Be sure to make and go to all appointments, and call your doctor if you are having problems. It's also a good idea to know your test results and keep a list of the medicines you take. How can you care for yourself at home? · To ease ear pain, apply a warm washcloth or a heating pad set on low. There may be some drainage from the ear when the heat melts earwax. Put a cloth between the heat source and your skin. Do not use a heating pad with children. · If your doctor prescribed antibiotics, take them as directed. Do not stop taking them just because you feel better. You need to take the full course of antibiotics. · Your doctor may recommend over-the-counter medicine. Be safe with medicines. Oral or nasal decongestants may relieve ear pain. Avoid decongestants that are combined with antihistamines, which tend to cause more blockage. But if allergies seem to be the problem, your doctor may recommend a combination. Be careful with cough and cold medicines. Don't give them to children younger than 6, because they don't work for children that age and can even be harmful. For children 6 and older, always follow all the instructions carefully. Make sure you know how much medicine to give and how long to use it. And use the dosing device if one is included. When should you call for help? Call your doctor now or seek immediate medical care if:    · You develop sudden, complete hearing loss.     · You have severe pain or feel dizzy.     · You have new or increasing pus or blood draining from your ear.     · You have redness, swelling, or pain around or behind the ear.    Watch closely for changes in your health, and be sure to contact your doctor if:    · You do not get better after 2 weeks.     · You have any new symptoms, such as itching or a feeling of fullness in the ear. Where can you learn more? Go to http://trixie-carlos manuel.info/.   Enter Y822 in the search box to learn more about \"Eustachian Tube Problems: Care Instructions. \"  Current as of: March 27, 2018  Content Version: 11.9  © 7736-6882 [employer]. Care instructions adapted under license by [employer] (which disclaims liability or warranty for this information). If you have questions about a medical condition or this instruction, always ask your healthcare professional. [address] any warranty or liability for your use of this information.

## 2019-03-03 DIAGNOSIS — D70.9 NEUTROPENIA, UNSPECIFIED TYPE (HCC): Primary | ICD-10-CM

## 2019-03-06 NOTE — TELEPHONE ENCOUNTER
Please call patient and let her know she does not need a breast MRI because she is only a little above average for developing breast cancer.   She need a yearly mammogram.

## 2019-03-07 NOTE — TELEPHONE ENCOUNTER
Called pt, and left a voice message, asking that she call the office back in regards to her testing results.

## 2019-05-13 ENCOUNTER — OFFICE VISIT (OUTPATIENT)
Dept: FAMILY MEDICINE CLINIC | Age: 54
End: 2019-05-13

## 2019-05-13 VITALS
HEART RATE: 65 BPM | WEIGHT: 179 LBS | RESPIRATION RATE: 18 BRPM | TEMPERATURE: 98.2 F | BODY MASS INDEX: 28.77 KG/M2 | SYSTOLIC BLOOD PRESSURE: 126 MMHG | DIASTOLIC BLOOD PRESSURE: 80 MMHG | HEIGHT: 66 IN

## 2019-05-13 DIAGNOSIS — M54.50 ACUTE LEFT-SIDED LOW BACK PAIN WITHOUT SCIATICA: Primary | ICD-10-CM

## 2019-05-13 RX ORDER — IBUPROFEN 800 MG/1
800 TABLET ORAL
Qty: 30 TAB | Refills: 0 | Status: SHIPPED | OUTPATIENT
Start: 2019-05-13 | End: 2019-10-21

## 2019-05-13 RX ORDER — METHOCARBAMOL 500 MG/1
500 TABLET, FILM COATED ORAL
Qty: 40 TAB | Refills: 0 | Status: SHIPPED | OUTPATIENT
Start: 2019-05-13 | End: 2019-10-21

## 2019-05-13 NOTE — PATIENT INSTRUCTIONS
Low Back Pain: Exercises  Your Care Instructions  Here are some examples of typical rehabilitation exercises for your condition. Start each exercise slowly. Ease off the exercise if you start to have pain. Your doctor or physical therapist will tell you when you can start these exercises and which ones will work best for you. How to do the exercises  Press-up    1. Lie on your stomach, supporting your body with your forearms. 2. Press your elbows down into the floor to raise your upper back. As you do this, relax your stomach muscles and allow your back to arch without using your back muscles. As your press up, do not let your hips or pelvis come off the floor. 3. Hold for 15 to 30 seconds, then relax. 4. Repeat 2 to 4 times. Alternate arm and leg (bird dog) exercise    1. Start on the floor, on your hands and knees. 2. Tighten your belly muscles. 3. Raise one leg off the floor, and hold it straight out behind you. Be careful not to let your hip drop down, because that will twist your trunk. 4. Hold for about 6 seconds, then lower your leg and switch to the other leg. 5. Repeat 8 to 12 times on each leg. 6. Over time, work up to holding for 10 to 30 seconds each time. 7. If you feel stable and secure with your leg raised, try raising the opposite arm straight out in front of you at the same time. Knee-to-chest exercise    1. Lie on your back with your knees bent and your feet flat on the floor. 2. Bring one knee to your chest, keeping the other foot flat on the floor (or keeping the other leg straight, whichever feels better on your lower back). 3. Keep your lower back pressed to the floor. Hold for at least 15 to 30 seconds. 4. Relax, and lower the knee to the starting position. 5. Repeat with the other leg. Repeat 2 to 4 times with each leg. 6. To get more stretch, put your other leg flat on the floor while pulling your knee to your chest.    Curl-ups    1.  Lie on the floor on your back with your knees bent at a 90-degree angle. Your feet should be flat on the floor, about 12 inches from your buttocks. 2. Cross your arms over your chest. If this bothers your neck, try putting your hands behind your neck (not your head), with your elbows spread apart. 3. Slowly tighten your belly muscles and raise your shoulder blades off the floor. 4. Keep your head in line with your body, and do not press your chin to your chest.  5. Hold this position for 1 or 2 seconds, then slowly lower yourself back down to the floor. 6. Repeat 8 to 12 times. Pelvic tilt exercise    1. Lie on your back with your knees bent. 2. \"Brace\" your stomach. This means to tighten your muscles by pulling in and imagining your belly button moving toward your spine. You should feel like your back is pressing to the floor and your hips and pelvis are rocking back. 3. Hold for about 6 seconds while you breathe smoothly. 4. Repeat 8 to 12 times. Heel dig bridging    1. Lie on your back with both knees bent and your ankles bent so that only your heels are digging into the floor. Your knees should be bent about 90 degrees. 2. Then push your heels into the floor, squeeze your buttocks, and lift your hips off the floor until your shoulders, hips, and knees are all in a straight line. 3. Hold for about 6 seconds as you continue to breathe normally, and then slowly lower your hips back down to the floor and rest for up to 10 seconds. 4. Do 8 to 12 repetitions. Hamstring stretch in doorway    1. Lie on your back in a doorway, with one leg through the open door. 2. Slide your leg up the wall to straighten your knee. You should feel a gentle stretch down the back of your leg. 3. Hold the stretch for at least 15 to 30 seconds. Do not arch your back, point your toes, or bend either knee. Keep one heel touching the floor and the other heel touching the wall. 4. Repeat with your other leg. 5. Do 2 to 4 times for each leg.     Hip flexor stretch    1. Kneel on the floor with one knee bent and one leg behind you. Place your forward knee over your foot. Keep your other knee touching the floor. 2. Slowly push your hips forward until you feel a stretch in the upper thigh of your rear leg. 3. Hold the stretch for at least 15 to 30 seconds. Repeat with your other leg. 4. Do 2 to 4 times on each side. Wall sit    1. Stand with your back 10 to 12 inches away from a wall. 2. Lean into the wall until your back is flat against it. 3. Slowly slide down until your knees are slightly bent, pressing your lower back into the wall. 4. Hold for about 6 seconds, then slide back up the wall. 5. Repeat 8 to 12 times. Follow-up care is a key part of your treatment and safety. Be sure to make and go to all appointments, and call your doctor if you are having problems. It's also a good idea to know your test results and keep a list of the medicines you take. Where can you learn more? Go to http://trixie-carlos manuel.info/. Enter X233 in the search box to learn more about \"Low Back Pain: Exercises. \"  Current as of: September 20, 2018  Content Version: 11.9  © 4450-1375 [employer]. Care instructions adapted under license by [employer] (which disclaims liability or warranty for this information). If you have questions about a medical condition or this instruction, always ask your healthcare professional. [place] [address] any warranty or liability for your use of this information. Getting Back to Normal After Low Back Pain: Care Instructions  Your Care Instructions  Almost everyone has low back pain at some time. The good news is that most low back pain will go away in a few days or weeks with some basic self-care. Some people are afraid that doing too much may make their pain worse. In the past, people stayed in bed, thinking this would help their backs.  Now doctors think that, in most cases, getting back to your normal activities is good for your back, as long as you avoid doing things that make your pain worse. Follow-up care is a key part of your treatment and safety. Be sure to make and go to all appointments, and call your doctor if you are having problems. It's also a good idea to know your test results and keep a list of the medicines you take. How can you care for yourself at home? Ease back into daily activities  · For the first day or two of pain, take it easy. But as soon as possible, get back to your normal daily life and activities. · Get gentle exercise, such as walking. Movement keeps your spine flexible and helps your muscles stay strong. · If you are an athlete, return to your activity carefully. Choose a low-impact option until your pain is under control. Avoid or change activities that cause pain  · Try to avoid too much bending, heavy lifting, or reaching. These movements put extra stress on your back. · In bed, try lying on your side with a pillow between your knees. Or lie on your back on the floor with a pillow under your knees. · When you sit, place a small pillow, a rolled-up towel, or a lumbar roll in the curve of your back for extra support. · Try putting one foot up on a stool or changing positions every few minutes if you have to stand still for a period of time. Pay attention to body mechanics and posture  Body mechanics are the way you use your body. Posture is the way you sit or stand. · Take extra care when you lift. When you must lift, bend your knees and keep your back straight. Avoid twisting, and keep the load close to your body. · Stand or sit tall, with your shoulders back and your stomach pulled in to support your back. Get support when you need it  · Let people know when you need a helping hand. Get family members or friends to help out with tasks you cannot do right now. · Be honest with your doctor about how the pain affects you.   · If you have had to take time off work, talk to your doctor and boss about a gradual [id number] plan. Find out if there are other ways you could do your job to avoid hurting your back again. Reduce stress  Worrying about the pain can cause you to tense the muscles in your lower back. This in turn causes more pain. Here are a few things you can do to relax your mind and your muscles:  · Take 10 to 15 minutes to sit quietly and breathe deeply. Try to focus only on your breathing. If you cannot keep thoughts away, think about things that make you feel good. · Get involved in your favorite hobby, or try something new. · Talk to a friend, read a book, or listen to your favorite music. · Find a counselor you like and trust. Talk openly and honestly about your problems. Be willing to make some changes. When should you call for help? Call 911 anytime you think you may need emergency care. For example, call if:    · You are unable to move a leg at all.   [facility] your doctor now or seek immediate medical care if:    · You have new or worse symptoms in your legs, belly, or buttocks. Symptoms may include:  ? Numbness or tingling. ? Weakness. ? Pain.     · You lose bladder or bowel control.    Watch closely for changes in your health, and be sure to contact your doctor if:    · You have a fever, lose weight, or don't feel well.     · You are not getting better as expected. Where can you learn more? Go to http://trixie-carlos manuel.info/. Enter I151 in the search box to learn more about \"Getting Back to Normal After Low Back Pain: Care Instructions. \"  Current as of: September 20, 2018  Content Version: 11.9  © 9111-4414 [employer]. Care instructions adapted under license by [employer] (which disclaims liability or warranty for this information).  If you have questions about a medical condition or this instruction, always ask your healthcare professional. Shanda Mello disclaims any warranty or liability for your use of this information.

## 2019-05-13 NOTE — PROGRESS NOTES
Chief Complaint   Patient presents with    Back Pain     lower left side; x 1 wk      1. Have you been to the ER, urgent care clinic since your last visit? Hospitalized since your last visit? No     2. Have you seen or consulted any other health care providers outside of the [address] [place] [address] since your last visit? Include any pap smears or colon screening.  No

## 2019-05-13 NOTE — PROGRESS NOTES
HISTORY OF PRESENT ILLNESS  Monique Perez is a 52 y.o. female. Monique Perez is here for low back pain which started 4 days ago. She has a history of left sciatica and her symptoms are similar. It started after lifting weights, going line dancing, then bending over to pick something The pain is not getting worse, and is getting better. It is located in the left low back and only present with movement. There is no radiation. It is described as sharp to aching. There is no LE tingling, is no LE weakness, is no LE numbness. They have tried Aleve, which helped temporarily. Review of Systems   Constitutional: Negative for chills and fever. Gastrointestinal:        No bowel incontinence   Genitourinary:        No bladder incontinence   Musculoskeletal: Positive for back pain. Negative for falls. Neurological: Negative for tingling, sensory change and focal weakness. Visit Vitals  /80   Pulse 65   Temp 98.2 °F (36.8 °C) (Oral)   Resp 18   Ht 5' 6\" (1.676 m)   Wt 179 lb (81.2 kg)   BMI 28.89 kg/m²     Physical Exam   Constitutional: She is oriented to person, place, and time. She appears well-developed and well-nourished. No distress. Cardiovascular: Normal rate, regular rhythm and normal heart sounds. Exam reveals no gallop and no friction rub. No murmur heard. Pulmonary/Chest: Effort normal and breath sounds normal. No respiratory distress. She has no wheezes. She has no rales. Musculoskeletal:        Lumbar back: She exhibits normal range of motion, no tenderness, no bony tenderness, no pain and no spasm. Neurological: She is alert and oriented to person, place, and time. She has normal strength. No sensory deficit. Gait normal.   Reflex Scores:       Patellar reflexes are 2+ on the right side and 2+ on the left side. Achilles reflexes are 2+ on the right side and 2+ on the left side. Negative SLRs   Skin: Skin is warm and dry. She is not diaphoretic. ASSESSMENT and PLAN    ICD-10-CM ICD-9-CM    1. Acute left-sided low back pain without sciatica M54.5 724.2 ibuprofen (MOTRIN) 800 mg tablet      methocarbamol (ROBAXIN) 500 mg tablet        Likely due to strain  Rest, heat  Motrin prn, may add Robaxin prn  Back exercises    Follow-up and Dispositions    · Return if not better in 4 weeks. Reviewed plan of care. Patient has provided input and agrees with goals.

## 2019-10-21 ENCOUNTER — OFFICE VISIT (OUTPATIENT)
Dept: FAMILY MEDICINE CLINIC | Age: 54
End: 2019-10-21

## 2019-10-21 VITALS
DIASTOLIC BLOOD PRESSURE: 72 MMHG | WEIGHT: 166 LBS | TEMPERATURE: 97.4 F | HEIGHT: 66 IN | SYSTOLIC BLOOD PRESSURE: 126 MMHG | BODY MASS INDEX: 26.68 KG/M2 | HEART RATE: 75 BPM | RESPIRATION RATE: 18 BRPM

## 2019-10-21 DIAGNOSIS — J06.9 VIRAL UPPER RESPIRATORY TRACT INFECTION: Primary | ICD-10-CM

## 2019-10-21 DIAGNOSIS — R05.9 COUGH: ICD-10-CM

## 2019-10-21 RX ORDER — GUAIFENESIN AND PSEUDOEPHEDRINE HCL 1200; 120 MG/1; MG/1
1 TABLET, EXTENDED RELEASE ORAL 2 TIMES DAILY
COMMUNITY
End: 2020-02-27

## 2019-10-21 RX ORDER — BENZONATATE 200 MG/1
200 CAPSULE ORAL
Qty: 30 CAP | Refills: 0 | Status: SHIPPED | OUTPATIENT
Start: 2019-10-21 | End: 2020-02-27

## 2019-10-21 NOTE — PROGRESS NOTES
HISTORY OF PRESENT ILLNESS  Mikki Valdes is a 62 y.o. female. Patient presents with:  Headache: coughing x 3 days; feeling of malaise    She is getting worse. Nothing in particular is making her worse or better. Also, she has chronic insomnia. Review of Systems   Constitutional: Positive for malaise/fatigue. Negative for chills and fever. HENT: Positive for sore throat. Negative for congestion and ear pain. Postnasal drainage   Eyes: Negative for discharge and redness. Respiratory: Positive for sputum production. Negative for cough, shortness of breath and wheezing. Her sputum is foamy and white   Cardiovascular: Positive for chest pain. Her chest hurts when coughing and is a little tight   Neurological: Negative for headaches. Visit Vitals  /72   Pulse 75   Temp 97.4 °F (36.3 °C) (Oral)   Resp 18   Ht 5' 6\" (1.676 m)   Wt 166 lb (75.3 kg)   BMI 26.79 kg/m²     Physical Exam   Constitutional: She is oriented to person, place, and time. She appears well-developed and well-nourished. No distress. HENT:   Head: Normocephalic. Right Ear: Tympanic membrane, external ear and ear canal normal.   Left Ear: Tympanic membrane, external ear and ear canal normal.   Nose: Nose normal. Right sinus exhibits no maxillary sinus tenderness and no frontal sinus tenderness. Left sinus exhibits no maxillary sinus tenderness and no frontal sinus tenderness. Mouth/Throat: Uvula is midline, oropharynx is clear and moist and mucous membranes are normal.   Eyes: Right eye exhibits no discharge. Left eye exhibits no discharge. Right conjunctiva is not injected. Left conjunctiva is not injected. Cardiovascular: Normal rate, regular rhythm and normal heart sounds. Exam reveals no gallop and no friction rub. No murmur heard. Pulmonary/Chest: Effort normal and breath sounds normal. No respiratory distress. She has no wheezes. She has no rales.    Lymphadenopathy:     She has no cervical adenopathy. Neurological: She is alert and oriented to person, place, and time. Skin: Skin is warm and dry. She is not diaphoretic. Nursing note and vitals reviewed. ASSESSMENT and PLAN    ICD-10-CM ICD-9-CM    1. Viral upper respiratory tract infection J06.9 465.9 PSEUDOEPHEDRINE-guaiFENesin (MUCINEX D MAXIMUM STRENGTH) Tb12 extended release tablet   2. Cough R05 786.2 benzonatate (TESSALON) 200 mg capsule        Rest, push fluids  Mucinex D prn  Tessalon prn    Follow-up and Dispositions    · Return in about 1 week (around 10/28/2019) for insomnia. Reviewed plan of care. Patient has provided input and agrees with goals.

## 2019-10-21 NOTE — PROGRESS NOTES
Chief Complaint   Patient presents with    Headache     coughing x 3 days; feeling of malaise     1. Have you been to the ER, urgent care clinic since your last visit? Hospitalized since your last visit? No     2. Have you seen or consulted any other health care providers outside of the [address] [place] [address] since your last visit? Include any pap smears or colon screening.  No

## 2020-02-11 ENCOUNTER — HOSPITAL ENCOUNTER (OUTPATIENT)
Dept: MAMMOGRAPHY | Age: 55
Discharge: HOME | End: 2020-02-11
Attending: FAMILY MEDICINE
Payer: COMMERCIAL

## 2020-02-11 ENCOUNTER — TELEPHONE (OUTPATIENT)
Dept: FAMILY MEDICINE CLINIC | Age: 55
End: 2020-02-11

## 2020-02-11 DIAGNOSIS — Z12.31 VISIT FOR SCREENING MAMMOGRAM: ICD-10-CM

## 2020-02-11 PROCEDURE — 77067 SCR MAMMO BI INCL CAD: CPT

## 2020-02-13 NOTE — TELEPHONE ENCOUNTER
Called and spoke with pt, and she has been advised and states understanding of results and providing the following information. 1. No  3 62years old  1 13years old  3 19years old  5. Sister  6. No   7.  After [facility]

## 2020-02-19 NOTE — TELEPHONE ENCOUNTER
Called and spoke with pt, and she has been advised and states understanding of this. Pt agrees with plan.

## 2020-02-19 NOTE — TELEPHONE ENCOUNTER
Please call patient and let her know she is a above average risk for breast cancer, but not enough to warrant an MRI.   She needs an annual mammogram.

## 2020-02-26 NOTE — PROGRESS NOTES
Subjective: Jake Russell is a 62 y.o. female here for annual physical exam.  She thinks she has SAD. Health Habits. Lifestyle:  Occupation:    Household members:  1, patient  Last dental appointment: This past fall  Last eye exam:  About 6 months ago  Uses seatbelts regularly :  yes  Getting regular exercise:  yes  Last colonoscopy:   12/2/13  Last mammogram:  2/11/20       Patient Active Problem List   Diagnosis Code    PE (pulmonary embolism) I26.99    Ankle fracture S82.899A    S/P MIKY-BSO Z90.710, Z90.722, Z90.79    GERD (gastroesophageal reflux disease) K21.9    History of normal resting EKG [id number]    S/P colonoscopy Z98.890    Migraine with aura and without status migrainosus, not intractable G43. 109    Meralgia paresthetica of left side G57.12    Family history of premature CAD Z82.49     Past Medical History:   Diagnosis Date    Anemia     DVT (deep vein thrombosis) in pregnancy     2005    Family history of premature CAD 12/14/2016    GERD (gastroesophageal reflux disease)     H. pylori infection 7/2013    Meralgia paresthetica of left side 12/14/2016    Migraine with aura and without status migrainosus, not intractable 12/14/2016    PE (pulmonary embolism)     2005    Thromboembolus ([address]     mom did too     Past Surgical History:   Procedure Laterality Date    HX ANKLE FRACTURE [place]  2005    left ankle-hardware    HX GYN      hysterectomy    HX ORTHOPAEDIC      Carpel Tunnel surgery    HX REFRACTIVE SURGERY        Family History   Problem Relation Age of Onset    Heart Disease Mother 50        MI    Hypertension Mother     Deep Vein Thrombosis Mother     Depression Mother     Heart Disease Father 72        MI    Hypertension Father     Seizures Father    [facility] Migraines Daughter     Attention Deficit Hyperactivity Disorder Daughter     Depression Daughter     Migraines Sister     Diabetes Sister     Stroke Sister     Breast Cancer Sister  Cancer Brother         bone    Neuropathy Brother     No Known Problems Sister     No Known Problems Sister     Stroke Sister     Hypertension Sister     No Known Problems Brother     Cancer Brother     No Known Problems Brother     No Known Problems Son      Social History     Tobacco Use    Smoking status: Never Smoker    Smokeless tobacco: Never Used   Substance Use Topics    Alcohol use: Yes     Alcohol/week: 1.7 standard drinks     Types: 1 Glasses of wine, 1 Shots of liquor per week     Comment: occ wine or [place]     No Known Allergies  Current Outpatient Medications   Medication Sig Dispense Refill    PSEUDOEPHEDRINE-guaiFENesin (MUCINEX D MAXIMUM STRENGTH) Tb12 extended release tablet Take 1 Tab by mouth two (2) times a day.  benzonatate (TESSALON) 200 mg capsule Take 1 Cap by mouth three (3) times daily as needed for Cough. 30 Cap 0    astaxanthin 4 mg cap Take  by mouth.  magnesium oxide (MAG-OX) 400 mg tablet Take 400 mg by mouth daily.  ZINC GLUCONATE (ZINC NATURAL PO) Take  by mouth daily.  MELATONIN (MELATIN PO) Take  by mouth nightly.  folic acid 055 mcg tablet Take 800 mcg by mouth daily.  cholecalciferol (VITAMIN D3) 1,000 unit tablet Take  by mouth daily.  Biotin 2,500 mcg cap Take  by mouth.  VITAMIN E ACETATE (VITAMIN E PO) Take  by mouth.  cyanocobalamin (VITAMIN B12) 100 mcg tablet Take 100 mcg by mouth daily.  OM-3/DHA/EPA/FISH OIL/VIT D3 (FISH OIL-VIT D3 PO) Take  by mouth.           Review of Systems  A comprehensive review of systems was negative except for: Neurological: positive for headaches and her migraines are becoming more frequent    Objective:  Visit Vitals  /87   Pulse 63   Temp 96 °F (35.6 °C) (Oral)   Resp 18   Ht 5' 6\" (1.676 m)   Wt 165 lb (74.8 kg)   BMI 26.63 kg/m²     Physical Examination:   General appearance - alert, well appearing, and in no distress  Mental status - alert, oriented to person, place, and time, normal mood, behavior, speech, dress, motor activity, and thought processes  Eyes - pupils equal and reactive, extraocular eye movements intact, sclera anicteric  Ears - bilateral TM's and external ear canals normal  Nose - normal and patent, no erythema, discharge or polyps  Mouth - mucous membranes moist, pharynx normal without lesions and dental hygiene good  Neck - supple, no significant adenopathy, carotids upstroke normal bilaterally, no bruits, thyroid exam: thyroid is normal in size without nodules or tenderness  Lymphatics - no palpable lymphadenopathy, no hepatosplenomegaly  Chest - clear to auscultation, no wheezes, rales or rhonchi, symmetric air entry  Heart - normal rate, regular rhythm, normal S1, S2, no murmurs, rubs, clicks or gallops  Abdomen - soft, nontender, nondistended, no masses or organomegaly  bowel sounds normal  Breasts - breasts appear normal, no suspicious masses, no skin or nipple changes or axillary nodes  Pelvic - examination not indicated  Rectal - Kit for FOBT testing given to patient  Neurological - alert, oriented, normal speech, no focal findings or movement disorder noted  Musculoskeletal - normal gait  Extremities - peripheral pulses normal, no pedal edema, no clubbing or cyanosis  Skin - normal coloration and turgor, no rashes, no suspicious skin lesions noted     Assessment/Plan:    ICD-10-CM ICD-9-CM    1. Routine general medical examination at a health care facility Z75.46 G51.6 METABOLIC PANEL, COMPREHENSIVE      CBC WITH AUTOMATED DIFF   2. Hypercholesteremia H32.49 197.6 METABOLIC PANEL, COMPREHENSIVE      LIPID PANEL   3. Screen for colon cancer Z12.11 V76.51 OCCULT BLOOD IMMUNOASSAY,DIAGNOSTIC   4. Encounter for immunization Z23 V03.89 varicella-zoster recombinant, PF, (SHINGRIX) 50 mcg/0.5 mL susr injection         Breast awareness  Labs per orders.   Shingrix at pharmacy    Follow-up and Dispositions    · Return in about 2 weeks (around 3/12/2020), or migraines, possible SAD. Reviewed plan of care. Patient has provided input and agrees with goals.

## 2020-02-27 ENCOUNTER — OFFICE VISIT (OUTPATIENT)
Dept: FAMILY MEDICINE CLINIC | Age: 55
End: 2020-02-27

## 2020-02-27 ENCOUNTER — HOSPITAL ENCOUNTER (OUTPATIENT)
Dept: LAB | Age: 55
Discharge: HOME | End: 2020-02-27

## 2020-02-27 VITALS
TEMPERATURE: 96 F | HEART RATE: 63 BPM | WEIGHT: 165 LBS | RESPIRATION RATE: 18 BRPM | SYSTOLIC BLOOD PRESSURE: 125 MMHG | DIASTOLIC BLOOD PRESSURE: 87 MMHG | BODY MASS INDEX: 26.52 KG/M2 | HEIGHT: 66 IN

## 2020-02-27 DIAGNOSIS — E78.00 HYPERCHOLESTEREMIA: ICD-10-CM

## 2020-02-27 DIAGNOSIS — Z00.00 ROUTINE GENERAL MEDICAL EXAMINATION AT A HEALTH CARE FACILITY: Primary | ICD-10-CM

## 2020-02-27 DIAGNOSIS — Z00.00 ROUTINE GENERAL MEDICAL EXAMINATION AT A HEALTH CARE FACILITY: ICD-10-CM

## 2020-02-27 DIAGNOSIS — Z23 ENCOUNTER FOR IMMUNIZATION: ICD-10-CM

## 2020-02-27 DIAGNOSIS — Z12.11 SCREEN FOR COLON CANCER: ICD-10-CM

## 2020-02-27 LAB
ALBUMIN SERPL-MCNC: 4.2 G/DL (ref 3.5–5)
ALBUMIN/GLOB SERPL: 1.2 {RATIO} (ref 1.1–2.2)
ALP SERPL-CCNC: 77 U/L (ref 45–117)
ALT SERPL-CCNC: 93 U/L (ref 12–78)
ANION GAP SERPL CALC-SCNC: 5 MMOL/L (ref 5–15)
AST SERPL-CCNC: 45 U/L (ref 15–37)
BASOPHILS # BLD: 0 K/UL (ref 0–0.1)
BASOPHILS NFR BLD: 1 % (ref 0–1)
BILIRUB SERPL-MCNC: 0.5 MG/DL (ref 0.2–1)
BUN SERPL-MCNC: 15 MG/DL (ref 6–20)
BUN/CREAT SERPL: 13 (ref 12–20)
CALCIUM SERPL-MCNC: 9.9 MG/DL (ref 8.5–10.1)
CHLORIDE SERPL-SCNC: 104 MMOL/L (ref 97–108)
CHOLEST SERPL-MCNC: 264 MG/DL
CO2 SERPL-SCNC: 29 MMOL/L (ref 21–32)
CREAT SERPL-MCNC: 1.17 MG/DL (ref 0.55–1.02)
DIFFERENTIAL METHOD BLD: ABNORMAL
EOSINOPHIL # BLD: 0.2 K/UL (ref 0–0.4)
EOSINOPHIL NFR BLD: 5 % (ref 0–7)
ERYTHROCYTE [DISTWIDTH] IN BLOOD BY AUTOMATED COUNT: 12.4 % (ref 11.5–14.5)
GLOBULIN SER CALC-MCNC: 3.4 G/DL (ref 2–4)
GLUCOSE SERPL-MCNC: 112 MG/DL (ref 65–100)
HCT VFR BLD AUTO: 42.4 % (ref 35–47)
HDLC SERPL-MCNC: 109 MG/DL
HDLC SERPL: 2.4 {RATIO} (ref 0–5)
HGB BLD-MCNC: 13.5 G/DL (ref 11.5–16)
IMM GRANULOCYTES # BLD AUTO: 0 K/UL (ref 0–0.04)
IMM GRANULOCYTES NFR BLD AUTO: 1 % (ref 0–0.5)
LDLC SERPL CALC-MCNC: 140.6 MG/DL (ref 0–100)
LIPID PROFILE,FLP: ABNORMAL
LYMPHOCYTES # BLD: 1.3 K/UL (ref 0.8–3.5)
LYMPHOCYTES NFR BLD: 37 % (ref 12–49)
MCH RBC QN AUTO: 30.3 PG (ref 26–34)
MCHC RBC AUTO-ENTMCNC: 31.8 G/DL (ref 30–36.5)
MCV RBC AUTO: 95.3 FL (ref 80–99)
MONOCYTES # BLD: 0.3 K/UL (ref 0–1)
MONOCYTES NFR BLD: 9 % (ref 5–13)
NEUTS SEG # BLD: 1.7 K/UL (ref 1.8–8)
NEUTS SEG NFR BLD: 47 % (ref 32–75)
NRBC # BLD: 0 K/UL (ref 0–0.01)
NRBC BLD-RTO: 0 PER 100 WBC
PLATELET # BLD AUTO: 271 K/UL (ref 150–400)
PMV BLD AUTO: 10.4 FL (ref 8.9–12.9)
POTASSIUM SERPL-SCNC: 4.3 MMOL/L (ref 3.5–5.1)
PROT SERPL-MCNC: 7.6 G/DL (ref 6.4–8.2)
RBC # BLD AUTO: 4.45 M/UL (ref 3.8–5.2)
SODIUM SERPL-SCNC: 138 MMOL/L (ref 136–145)
TRIGL SERPL-MCNC: 72 MG/DL (ref ?–150)
VLDLC SERPL CALC-MCNC: 14.4 MG/DL
WBC # BLD AUTO: 3.5 K/UL (ref 3.6–11)

## 2020-03-11 LAB — HEMOCCULT STL QL IA: NEGATIVE

## 2021-01-12 ENCOUNTER — TRANSCRIBE ORDER (OUTPATIENT)
Dept: SCHEDULING | Age: 56
End: 2021-01-12

## 2021-01-12 DIAGNOSIS — Z12.31 VISIT FOR SCREENING MAMMOGRAM: Primary | ICD-10-CM

## 2021-02-23 ENCOUNTER — HOSPITAL ENCOUNTER (OUTPATIENT)
Dept: MAMMOGRAPHY | Age: 56
Discharge: HOME | End: 2021-02-23
Attending: FAMILY MEDICINE
Payer: COMMERCIAL

## 2021-02-23 DIAGNOSIS — Z12.31 VISIT FOR SCREENING MAMMOGRAM: ICD-10-CM

## 2021-02-23 PROCEDURE — 77067 SCR MAMMO BI INCL CAD: CPT

## 2021-03-09 PROBLEM — E78.00 HYPERCHOLESTEROLEMIA: Status: ACTIVE | Noted: 2021-03-09

## 2021-03-10 ENCOUNTER — OFFICE VISIT (OUTPATIENT)
Dept: FAMILY MEDICINE CLINIC | Age: 56
End: 2021-03-10
Payer: COMMERCIAL

## 2021-03-10 ENCOUNTER — TRANSCRIBE ORDER (OUTPATIENT)
Dept: REGISTRATION | Age: 56
End: 2021-03-10

## 2021-03-10 ENCOUNTER — HOSPITAL ENCOUNTER (OUTPATIENT)
Dept: GENERAL RADIOLOGY | Age: 56
Discharge: HOME | End: 2021-03-10

## 2021-03-10 VITALS
HEIGHT: 66 IN | OXYGEN SATURATION: 99 % | HEART RATE: 71 BPM | TEMPERATURE: 97 F | DIASTOLIC BLOOD PRESSURE: 78 MMHG | RESPIRATION RATE: 20 BRPM | BODY MASS INDEX: 27.32 KG/M2 | WEIGHT: 170 LBS | SYSTOLIC BLOOD PRESSURE: 130 MMHG

## 2021-03-10 DIAGNOSIS — E78.00 HYPERCHOLESTEROLEMIA: ICD-10-CM

## 2021-03-10 DIAGNOSIS — R05.3 CHRONIC COUGH: ICD-10-CM

## 2021-03-10 DIAGNOSIS — Z00.00 ROUTINE GENERAL MEDICAL EXAMINATION AT A HEALTH CARE FACILITY: Primary | ICD-10-CM

## 2021-03-10 PROCEDURE — 99396 PREV VISIT EST AGE 40-64: CPT | Performed by: FAMILY MEDICINE

## 2021-03-10 NOTE — PROGRESS NOTES
Subjective: Mary Conte is a 60 y.o. female here for annual physical exam.       Health Habits. Lifestyle:  Occupation:  , [address] members:  1, patient  Last dental appointment: This past December  Last eye exam:  Last month  Uses seatbelts regularly :  yes  Getting regular exercise:  yes  Last colonoscopy:   12/2/13  Last mammogram:  Last month      Patient Active Problem List   Diagnosis Code    PE (pulmonary embolism) I26.99    Ankle fracture S82.899A    S/P MIKY-BSO Z90.710, Z90.722, Z90.79    GERD (gastroesophageal reflux disease) K21.9    History of normal resting EKG [id number]    S/P colonoscopy Z98.890    Migraine with aura and without status migrainosus, not intractable G43. 109    Meralgia paresthetica of left side G57.12    Family history of premature CAD Z82.49     Past Medical History:   Diagnosis Date    Anemia     DVT (deep vein thrombosis) in pregnancy     2005    Family history of premature CAD 12/14/2016    GERD (gastroesophageal reflux disease)     H. pylori infection 7/2013    Meralgia paresthetica of left side 12/14/2016    Migraine with aura and without status migrainosus, not intractable 12/14/2016    PE (pulmonary embolism)     2005    Thromboembolus ([address]     mom did too     Past Surgical History:   Procedure Laterality Date    HX ANKLE FRACTURE TX  2005    left ankle-hardware    HX GYN      hysterectomy    HX ORTHOPAEDIC      Carpel Tunnel surgery    HX REFRACTIVE SURGERY        Family History   Problem Relation Age of Onset    Heart Disease Mother 50        MI    Hypertension Mother     Deep Vein Thrombosis Mother     Depression Mother     Heart Disease Father 72        MI    Hypertension Father     Seizures Father    [facility] Migraines Daughter     Attention Deficit Hyperactivity Disorder Daughter     Depression Daughter     Migraines Sister     Diabetes Sister     Stroke Sister     Breast Cancer Sister     Cancer Brother bone    Neuropathy Brother     No Known Problems Sister     No Known Problems Sister     Stroke Sister     Hypertension Sister     No Known Problems Brother     Cancer Brother     No Known Problems Brother     No Known Problems Son      Social History     Tobacco Use    Smoking status: Never Smoker    Smokeless tobacco: Never Used   Substance Use Topics    Alcohol use: Yes     Alcohol/week: 1.7 standard drinks     Types: 1 Glasses of wine, 1 Shots of liquor per week     Comment: occ wine or madison     No Known Allergies  Current Outpatient Medications   Medication Sig Dispense Refill    astaxanthin 4 mg cap Take  by mouth.  magnesium oxide (MAG-OX) 400 mg tablet Take 400 mg by mouth daily.  ZINC GLUCONATE (ZINC NATURAL PO) Take  by mouth daily.  MELATONIN (MELATIN PO) Take  by mouth nightly.  folic acid 467 mcg tablet Take 800 mcg by mouth daily.  cholecalciferol (VITAMIN D3) 1,000 unit tablet Take  by mouth daily.  Biotin 2,500 mcg cap Take  by mouth.  VITAMIN E ACETATE (VITAMIN E PO) Take  by mouth.  cyanocobalamin (VITAMIN B12) 100 mcg tablet Take 100 mcg by mouth daily.  OM-3/DHA/EPA/FISH OIL/VIT D3 (FISH OIL-VIT D3 PO) Take  by mouth.       .    Review of Systems  A comprehensive review of systems was negative except for: Respiratory: positive for cough when walking for exercise in the early am    Objective:  Visit Vitals  /78 Comment: left arm, manual cuff   Pulse 71   Temp 97 °F (36.1 °C) (Temporal)   Resp 20   Ht 5' 6\" (1.676 m)   Wt 170 lb (77.1 kg)   SpO2 99%   BMI 27.44 kg/m²     Physical Examination:   General appearance - alert, well appearing, and in no distress  Mental status - alert, oriented to person, place, and time, normal mood, behavior, speech, dress, motor activity, and thought processes  Eyes - pupils equal and reactive, extraocular eye movements intact, sclera anicteric  Ears - bilateral TM's and external ear canals normal  Nose - deferred due to COVID-19   Mouth - deferred due to Continue current plans. Neck - supple, no significant adenopathy, carotids upstroke normal bilaterally, no bruits, thyroid exam: thyroid is normal in size without nodules or tenderness  Lymphatics - no palpable lymphadenopathy, no hepatosplenomegaly  Chest - clear to auscultation, no wheezes, rales or rhonchi, symmetric air entry  Heart - normal rate, regular rhythm, normal S1, S2, no murmurs, rubs, clicks or gallops  Abdomen - soft, nontender, nondistended, no masses or organomegaly  bowel sounds normal  Breasts - breasts appear normal, no suspicious masses, no skin or nipple changes or axillary nodes  Pelvic - examination not indicated  Rectal - deferred, colonoscopy up to date  Neurological - alert, oriented, normal speech, no focal findings or movement disorder noted  Musculoskeletal - normal gait  Extremities - peripheral pulses normal, no pedal edema, no clubbing or cyanosis  Skin - normal coloration and turgor, no rashes, no suspicious skin lesions noted     Assessment/Plan:    ICD-10-CM ICD-9-CM    1. Routine general medical examination at a health care facility  L22.12 T08.3 METABOLIC PANEL, COMPREHENSIVE      CBC WITH AUTOMATED DIFF   2. Chronic cough  R05 786.2 XR CHEST PA LAT      PULMONARY FUNCTION TEST   3. Hypercholesterolemia  U08.84 265.3 METABOLIC PANEL, COMPREHENSIVE      LIPID PANEL         Labs per orders. chest X-ray  pulmonary function tests      Follow-up and Dispositions    · Return in about 1 year (around 3/10/2022) for physical.           Reviewed plan of care. Patient has provided input and agrees with goals.

## 2021-03-25 ENCOUNTER — TELEPHONE (OUTPATIENT)
Dept: FAMILY MEDICINE CLINIC | Age: 56
End: 2021-03-25

## 2021-03-25 NOTE — TELEPHONE ENCOUNTER
Patient Called office, received a bill from [facility] for over $100.00. Advised per her insurance, [employer] is not in network. Pt will like bill written off.

## 2021-03-30 NOTE — TELEPHONE ENCOUNTER
Called pt accounts. Was on hold for over 15 minutes, and did not receive an answer. Left a voice message, asking that someone call me back in regards to pt. Per voice mail, if message is left, someone will call back with in 1 business day.

## 2021-04-19 NOTE — TELEPHONE ENCOUNTER
Message has been forward to our accounts department, [facility], [address] to follow up with pt.

## 2021-04-29 ENCOUNTER — TELEPHONE (OUTPATIENT)
Dept: FAMILY MEDICINE CLINIC | Age: 56
End: 2021-04-29

## 2021-04-29 NOTE — TELEPHONE ENCOUNTER
Pt called stating she's been on hold with the billing office for 22 minutes regarding bill received for labs done after her physical done 3/10/21, called her insurance company ([employer]), was advised her labs should have been done at [facility], and that our office should have informed her of this. Our system normally generates lab forms based on insurance to go to either [facility] or the [facility]. Pt states she also called about this issue 3/25/21 and never received return call. Please call pt at [phone] regarding this issue.

## 2021-05-03 NOTE — TELEPHONE ENCOUNTER
A message was left with billing department, weeks ago, about this. I am unable to pull pt's bill and change orders.

## 2021-05-17 ENCOUNTER — OFFICE VISIT (OUTPATIENT)
Dept: FAMILY MEDICINE CLINIC | Age: 56
End: 2021-05-17
Payer: COMMERCIAL

## 2021-05-17 ENCOUNTER — NURSE TRIAGE (OUTPATIENT)
Dept: OTHER | Facility: CLINIC | Age: 56
End: 2021-05-17

## 2021-05-17 VITALS
WEIGHT: 165 LBS | HEIGHT: 66 IN | DIASTOLIC BLOOD PRESSURE: 75 MMHG | HEART RATE: 53 BPM | RESPIRATION RATE: 18 BRPM | TEMPERATURE: 96.8 F | BODY MASS INDEX: 26.52 KG/M2 | OXYGEN SATURATION: 98 % | SYSTOLIC BLOOD PRESSURE: 139 MMHG

## 2021-05-17 DIAGNOSIS — J06.9 VIRAL UPPER RESPIRATORY TRACT INFECTION: Primary | ICD-10-CM

## 2021-05-17 DIAGNOSIS — J34.89 SINUS PAIN: ICD-10-CM

## 2021-05-17 PROCEDURE — 99213 OFFICE O/P EST LOW 20 MIN: CPT | Performed by: FAMILY MEDICINE

## 2021-05-17 RX ORDER — AZITHROMYCIN 500 MG/1
500 TABLET, FILM COATED ORAL DAILY
Qty: 3 TAB | Refills: 0 | Status: SHIPPED | OUTPATIENT
Start: 2021-05-17 | End: 2021-05-20

## 2021-05-17 RX ORDER — GUAIFENESIN AND PSEUDOEPHEDRINE HCL 1200; 120 MG/1; MG/1
1 TABLET, EXTENDED RELEASE ORAL 2 TIMES DAILY
COMMUNITY

## 2021-05-17 NOTE — TELEPHONE ENCOUNTER
Reason for Disposition   [1] COVID-19 infection suspected by caller or triager AND [2] mild symptoms (cough, fever, or others) AND [5] no complications or SOB    Answer Assessment - Initial Assessment Questions  1. COVID-19 DIAGNOSIS: \"Who made your Coronavirus (COVID-19) diagnosis? \" \"Was it confirmed by a positive lab test?\" If not diagnosed by a HCP, ask \"Are there lots of cases (community spread) where you live? \" (See public health department website, if unsure)      No dx as of yet    2. COVID-19 EXPOSURE: \"Was there any known exposure to COVID before the symptoms began? \" CDC Definition of close contact: within 6 feet (2 meters) for a total of 15 minutes or more over a 24-hour period. Denies    3. ONSET: \"When did the COVID-19 symptoms start? \"       Wed-Thur    4. WORST SYMPTOM: \"What is your worst symptom? \" (e.g., cough, fever, shortness of breath, muscle aches)      HA    5. COUGH: \"Do you have a cough? \" If so, ask: \"How bad is the cough? \"        Slight due to drainage     6. FEVER: \"Do you have a fever? \" If so, ask: \"What is your temperature, how was it measured, and when did it start? \"      Denies    7. RESPIRATORY STATUS: \"Describe your breathing? \" (e.g., shortness of breath, wheezing, unable to speak)       Denies    8. BETTER-SAME-WORSE: Ulysess Bourne you getting better, staying the same or getting worse compared to yesterday? \"  If getting worse, ask, \"In what way? \"      Worse    9. HIGH RISK DISEASE: \"Do you have any chronic medical problems? \" (e.g., asthma, heart or lung disease, weak immune system, obesity, etc.)      Denies    10. PREGNANCY: \"Is there any chance you are pregnant? \" \"When was your last menstrual period? \"        NA    11. OTHER SYMPTOMS: \"Do you have any other symptoms? \"  (e.g., chills, fatigue, headache, loss of smell or taste, muscle pain, sore throat; new loss of smell or taste especially support the diagnosis of COVID-19)        Denies    Protocols used: CORONAVIRUS (COVID-19) DIAGNOSED OR SUSPECTED-ADULT-AH    Patient called [facility] with red flag complaint. Call received from [facility]    Brief description of triage: see above    Triage indicates for patient to call PCP when open. Care advice provided, patient verbalizes understanding; denies any other questions or concerns; instructed to call back for any new or worsening symptoms. Writer provided warm transfer to [facility] for appointment scheduling. Attention Provider: Thank you for allowing me to participate in the care of your patient. The patient was connected to triage from [facility]. Please do not respond through this encounter as the response is not directed to a shared pool.

## 2022-02-25 LAB — MAMMOGRAPHY, EXTERNAL: NORMAL

## 2022-03-18 PROBLEM — E78.00 HYPERCHOLESTEROLEMIA: Status: ACTIVE | Noted: 2021-03-09

## 2022-11-29 ENCOUNTER — TELEPHONE (OUTPATIENT)
Dept: FAMILY MEDICINE CLINIC | Age: 57
End: 2022-11-29

## 2022-11-29 NOTE — TELEPHONE ENCOUNTER
Please call patient and let her know I cannot order labs from [facility] because I am not employed with them. She can have them done at [facility] if she can let us know which one she will be using. Bola Oliveira

## 2022-11-29 NOTE — TELEPHONE ENCOUNTER
----- Message from Sasha Busby sent at 11/29/2022  9:02 AM EST -----  Subject: Referral Request    Reason for referral request? Patient would like her Lab orders placed then   faxed to [facility] on [facility]. She wants these completed prior to her   appt on Friday 12/02  Provider patient wants to be referred to(if known):     Provider Phone Number(if known): Additional Information for Provider?  Please call patient when orders sent.  ---------------------------------------------------------------------------  --------------  [address] [employer]    [phone]; OK to leave message on voicemail  ---------------------------------------------------------------------------  --------------

## 2022-12-01 ENCOUNTER — TELEPHONE (OUTPATIENT)
Dept: FAMILY MEDICINE CLINIC | Age: 57
End: 2022-12-01

## 2022-12-01 DIAGNOSIS — Z00.00 HEALTHCARE MAINTENANCE: Primary | ICD-10-CM

## 2022-12-01 RX ORDER — HUMAN PAPILLOMAVIRUS 9-VALENT VACCINE, RECOMBINANT 30; 40; 60; 40; 20; 20; 20; 20; 20 UG/.5ML; UG/.5ML; UG/.5ML; UG/.5ML; UG/.5ML; UG/.5ML; UG/.5ML; UG/.5ML; UG/.5ML
0.5 INJECTION, SUSPENSION INTRAMUSCULAR ONCE
Qty: 0.5 ML | Refills: 2 | Status: CANCELLED | OUTPATIENT
Start: 2022-12-01 | End: 2022-12-01

## 2022-12-01 NOTE — TELEPHONE ENCOUNTER
Pt sent a message on my chart yesterday but did not get a response. Please see her message below     Since my appointment on Friday 12/2 is not until 3PM and I need to have fasting labs done, would you please send my lab orders to [facility] to be drawn early Friday morning so I won't have to starve and go without coffee all day? Please call me to confirm: [phone].

## 2022-12-02 ENCOUNTER — OFFICE VISIT (OUTPATIENT)
Dept: FAMILY MEDICINE CLINIC | Age: 57
End: 2022-12-02
Payer: COMMERCIAL

## 2022-12-02 VITALS
RESPIRATION RATE: 18 BRPM | SYSTOLIC BLOOD PRESSURE: 128 MMHG | HEART RATE: 78 BPM | TEMPERATURE: 97.3 F | OXYGEN SATURATION: 99 % | DIASTOLIC BLOOD PRESSURE: 76 MMHG | HEIGHT: 66 IN | BODY MASS INDEX: 28.28 KG/M2 | WEIGHT: 176 LBS

## 2022-12-02 DIAGNOSIS — Z00.00 ROUTINE GENERAL MEDICAL EXAMINATION AT A HEALTH CARE FACILITY: Primary | ICD-10-CM

## 2022-12-02 DIAGNOSIS — M71.30 SYNOVIAL CYST: ICD-10-CM

## 2022-12-02 DIAGNOSIS — Z23 ENCOUNTER FOR IMMUNIZATION: ICD-10-CM

## 2022-12-02 RX ORDER — TETANUS TOXOID, REDUCED DIPHTHERIA TOXOID AND ACELLULAR PERTUSSIS VACCINE, ADSORBED 5; 2.5; 8; 8; 2.5 [IU]/.5ML; [IU]/.5ML; UG/.5ML; UG/.5ML; UG/.5ML
0.5 SUSPENSION INTRAMUSCULAR ONCE
Qty: 0.5 ML | Refills: 0 | Status: SHIPPED | OUTPATIENT
Start: 2022-12-02 | End: 2022-12-02

## 2022-12-02 RX ORDER — ZOSTER VACCINE RECOMBINANT, ADJUVANTED 50 MCG/0.5
KIT INTRAMUSCULAR
Qty: 0.5 ML | Refills: 1 | Status: SHIPPED | OUTPATIENT
Start: 2022-12-02 | End: 2022-12-02

## 2022-12-02 NOTE — PROGRESS NOTES
Swapna Marsh is a 59 y.o. female    Chief Complaint   Patient presents with    Complete Physical       Visit Vitals  /76   Pulse 78   Temp 97.3 °F (36.3 °C) (Temporal)   Resp 18   Ht 5' 6\" (1.676 m)   Wt 176 lb (79.8 kg)   SpO2 99%   BMI 28.41 kg/m²       3 most recent PHQ Screens 12/2/2022   Little interest or pleasure in doing things Not at all   Feeling down, depressed, irritable, or hopeless Not at all   Total Score PHQ 2 0       Fall Risk Assessment, last 12 mths 3/10/2021   Able to walk? Yes   Fall in past 12 months? 0   Do you feel unsteady? 0   Are you worried about falling 0       No flowsheet data found. 1. Have you been to the ER, urgent care clinic since your last visit? Hospitalized since your last visit? No     2. Have you seen or consulted any other health care providers outside of the [address] [place] [address] since your last visit? Include any pap smears or colon screening.  No

## 2022-12-02 NOTE — PROGRESS NOTES
Subjective: Laurinda Hatchet is a 59 y.o. y.o. female here for her annual routine checkup. Her gyn care is up to date. No LMP recorded. Patient has had a hysterectomy. Social History: not sexually active. Pertinent past medical hstory: no history of HTN, DVT, CAD, DM, liver disease, migraines or smoking. Health Habits/Lifestyle  Occupation:  , [employer]  Household members:  1, patient  Last dental appointment:   this past June  Last eye exam:  early summer  Last colonoscopy:  3/2020  Uses seatbelts regularly :  yes  Getting regular exercise:  yes  Last mammogram:  2/2022    Patient Active Problem List    Diagnosis Date Noted    Hypercholesterolemia 03/09/2021    Migraine with aura and without status migrainosus, not intractable 12/14/2016    Meralgia paresthetica of left side 12/14/2016    Family history of premature CAD 12/14/2016    S/P colonoscopy 12/02/2013    History of normal resting EKG 02/06/2012    PE (pulmonary embolism) 01/20/2011    Ankle fracture 01/20/2011    S/P MIKY-BSO 01/20/2011    GERD (gastroesophageal reflux disease) 01/20/2011     Current Outpatient Medications   Medication Sig Dispense Refill    PSEUDOEPHEDRINE-guaiFENesin (Mucinex D Maximum Strength) Tb12 extended release tablet Take 1 Tab by mouth two (2) times a day. astaxanthin 4 mg cap Take  by mouth.      magnesium oxide (MAG-OX) 400 mg tablet Take 400 mg by mouth daily. ZINC GLUCONATE (ZINC NATURAL PO) Take  by mouth daily. MELATONIN (MELATIN PO) Take  by mouth nightly. folic acid 621 mcg tablet Take 800 mcg by mouth daily. cholecalciferol (VITAMIN D3) 1,000 unit tablet Take  by mouth daily. Biotin 2,500 mcg cap Take  by mouth. VITAMIN E ACETATE (VITAMIN E PO) Take  by mouth.      cyanocobalamin (VITAMIN B12) 100 mcg tablet Take 100 mcg by mouth daily. OM-3/DHA/EPA/FISH OIL/VIT D3 (FISH OIL-VIT D3 PO) Take  by mouth.        No Known Allergies  Past Medical History:   Diagnosis Date    Anemia     DVT (deep vein thrombosis) in pregnancy     2005    Family history of premature CAD 12/14/2016    GERD (gastroesophageal reflux disease)     H. pylori infection 7/2013    Hypercholesterolemia 3/9/2021    Meralgia paresthetica of left side 12/14/2016    Migraine with aura and without status migrainosus, not intractable 12/14/2016    PE (pulmonary embolism)     2005    Thromboembolus ([address]     mom did too     Past Surgical History:   Procedure Laterality Date    HX ANKLE FRACTURE TX  2005    left ankle-hardware    HX GYN      hysterectomy    HX ORTHOPAEDIC      Carpel Tunnel surgery    HX REFRACTIVE SURGERY       Family History   Problem Relation Age of Onset    Heart Disease Mother 50        MI    Hypertension Mother     Deep Vein Thrombosis Mother     Depression Mother     Heart Disease Father 72        MI    Hypertension Father     Seizures Father     Migraines Daughter     Attention Deficit Hyperactivity Disorder Daughter     Depression Daughter     Migraines Sister     Diabetes Sister     Stroke Sister     Breast Cancer Sister     Cancer Brother         bone    Neuropathy Brother     No Known Problems Sister     No Known Problems Sister     Stroke Sister     Hypertension Sister     No Known Problems Brother     Cancer Brother     No Known Problems Brother     No Known Problems Son      Social History     Tobacco Use    Smoking status: Never    Smokeless tobacco: Never   Substance Use Topics    Alcohol use: Yes     Alcohol/week: 1.7 standard drinks     Types: 1 Glasses of wine, 1 Shots of liquor per week     Comment: occ wine or madison        ROS:  Feeling well. No dyspnea or chest pain on exertion. No abdominal pain, change in bowel habits, black or bloody stools. No urinary tract symptoms. GYN ROS: no menses, no abnormal bleeding, pelvic pain or discharge, no breast pain or new or enlarging lumps on self exam. No neurological complaints.   She has a \"knot\" on her right index finger that is sore.    Objective:  Visit Vitals  /76   Pulse 78   Temp 97.3 °F (36.3 °C) (Temporal)   Resp 18   Ht 5' 6\" (1.676 m)   Wt 176 lb (79.8 kg)   SpO2 99%   BMI 28.41 kg/m²     The patient appears well, alert, oriented x 3, in no distress. ENT normal.  Neck supple. No adenopathy or thyromegaly. LINDY. Lungs are clear, good air entry, no wheezes, rhonchi or rales. S1 and S2 normal, no murmurs, regular rate and rhythm. Abdomen soft without tenderness, guarding, mass or organomegaly. Extremities show no edema, normal peripheral pulses. Neurological is normal, no focal findings. Musculoskeletal-4 mm cystic lesion over the dorsal, right index finger at the PIP. BREAST EXAM: deferred to gyn    PELVIC EXAM: deferred to gyn    Assessment/Plan:    ICD-10-CM ICD-9-CM    1. Routine general medical examination at a health care facility  Z00.00 V70.0       2. Synovial cyst  M71.30 727.40 REFERRAL TO ORTHOPEDICS      3. Encounter for immunization  Z23 V03.89 diphth,pertus,acell,,tetanus (Boostrix Tdap) 2.5-8-5 Lf-mcg-Lf/0.5mL susp suspension      DISCONTINUED: varicella-zoster recombinant, PF, (Shingrix, PF,) 50 mcg/0.5 mL susr injection            Referral to hand surgery  TDAP at pharmacy    Follow-up and Dispositions    Return in about 1 year (around 12/2/2023) for physical.     .      Reviewed plan of care. Patient has provided input and agrees with goals.

## 2023-02-27 LAB
MAMMOGRAPHY, EXTERNAL: NORMAL
MAMMOGRAPHY, EXTERNAL: NORMAL

## 2023-03-09 ENCOUNTER — TELEPHONE (OUTPATIENT)
Dept: FAMILY MEDICINE CLINIC | Age: 58
End: 2023-03-09

## 2023-03-09 NOTE — TELEPHONE ENCOUNTER
Yanna Circe 852  Physician  Provider Summary    Title Resident? Provider type   MD No Physician     Primary Contact Information    Phone Fax E-mail Address   [phone] Not available [address] [address]       Patient has been provided information and will call to scheduled.

## 2023-03-21 ENCOUNTER — TELEPHONE (OUTPATIENT)
Dept: FAMILY MEDICINE CLINIC | Age: 58
End: 2023-03-21

## 2023-03-21 ENCOUNTER — VIRTUAL VISIT (OUTPATIENT)
Dept: FAMILY MEDICINE CLINIC | Age: 58
End: 2023-03-21
Payer: COMMERCIAL

## 2023-03-21 DIAGNOSIS — E66.3 OVERWEIGHT: Primary | ICD-10-CM

## 2023-03-21 PROCEDURE — 99213 OFFICE O/P EST LOW 20 MIN: CPT | Performed by: FAMILY MEDICINE

## 2023-03-21 RX ORDER — PEN NEEDLE, DIABETIC 30 GX3/16"
NEEDLE, DISPOSABLE MISCELLANEOUS
Qty: 50 EACH | Status: SHIPPED | OUTPATIENT
Start: 2023-03-21

## 2023-03-21 RX ORDER — SEMAGLUTIDE 0.25 MG/.5ML
0.25 INJECTION, SOLUTION SUBCUTANEOUS
Qty: 4 EACH | Refills: 1 | Status: SHIPPED | OUTPATIENT
Start: 2023-03-21

## 2023-03-21 NOTE — PROGRESS NOTES
Chief Complaint   Patient presents with    Weight Management     Pt states she is currently exercising 3 days a week- walking

## 2023-03-21 NOTE — PROGRESS NOTES
Carlota Camargo is a 59 y.o. female who was seen by synchronous (real-time) audio-video technology on 3/21/2023. Assessment & Plan:   Diagnoses and all orders for this visit:    1. Overweight  -     semaglutide, weight loss, (Wegovy) 0.25 mg/0.5 mL pnij; 0.25 mg by SubCUTAneous route every seven (7) days.  -     THYROID CASCADE PROFILE; Future    Other orders  -     Insulin Needles, Disposable, 31 gauge x 5/16\" ndle; Use once daily as directed      Increase exercise  Labs per orders. Wegovy    Follow-up and Dispositions    Return in about 6 weeks (around 5/2/2023) for check weight. Reviewed plan of care. Patient has provided input and agrees with goals. CPT Codes 19893-69119 for Established Patients may apply to this Telehealth Visit      Subjective: Carlota Camargo was seen for Weight Management (Pt states she is currently exercising 3 days a week- walking )      Patient presents with:  Weight Management: Pt states she is currently exercising 3 days a week- walking     She has gained 25 pounds over the past year. Keto worked until she started gaining weight. Her weight gain may have started when she cut back on her exercise. Review of Systems   Constitutional:  Negative for malaise/fatigue. HENT:          No voice changes. Skin:         No hair loss or skin changes. Psychiatric/Behavioral:  Negative for depression. Objective:     Physical Exam  Constitutional:       General: She is not in acute distress. Appearance: Normal appearance. Neurological:      Mental Status: She is alert and oriented to person, place, and time. Due to this being a TeleHealth evaluation, many elements of the physical examination are unable to be assessed. We discussed the expected course, resolution and complications of the diagnosis(es) in detail. Medication risks, benefits, costs, interactions, and alternatives were discussed as indicated.   I advised her to contact the office if her condition worsens, changes or fails to improve as anticipated. She expressed understanding with the diagnosis(es) and plan. Pursuant to the emergency declaration under the [facility], [facility] waiver authority and the [employer] and [employer] Act, this Virtual  Visit was conducted, with patient's consent, to reduce the patient's risk of exposure to COVID-19 and provide continuity of care for an established patient. Services were provided through a video synchronous discussion virtually to substitute for in-person clinic visit.     Rhoda Prasad MD

## 2023-03-31 LAB — TSH SERPL DL<=0.005 MIU/L-ACNC: 2.05 UIU/ML (ref 0.45–4.5)

## 2023-04-18 ENCOUNTER — TELEPHONE (OUTPATIENT)
Dept: FAMILY MEDICINE CLINIC | Age: 58
End: 2023-04-18

## 2023-04-18 DIAGNOSIS — E66.3 OVERWEIGHT: ICD-10-CM

## 2023-04-18 RX ORDER — SEMAGLUTIDE 0.25 MG/.5ML
0.5 INJECTION, SOLUTION SUBCUTANEOUS
Qty: 4 EACH | Refills: 1 | Status: SHIPPED | OUTPATIENT
Start: 2023-04-18 | End: 2023-04-24 | Stop reason: DRUGHIGH

## 2023-04-24 RX ORDER — SEMAGLUTIDE 0.5 MG/.5ML
0.5 INJECTION, SOLUTION SUBCUTANEOUS
Qty: 4 EACH | Refills: 1 | Status: SHIPPED | OUTPATIENT
Start: 2023-04-24

## 2023-04-24 NOTE — TELEPHONE ENCOUNTER
Pt is calling back the dosage is wrong on rx sent 04/18/23 it was sent for 0.5ml should be 0.5mg. She only wants to take one shot, not 2. She is very frustrated, this has been going on since the 18th. Wants it corrected asap.

## 2023-05-09 SDOH — ECONOMIC STABILITY: FOOD INSECURITY: WITHIN THE PAST 12 MONTHS, THE FOOD YOU BOUGHT JUST DIDN'T LAST AND YOU DIDN'T HAVE MONEY TO GET MORE.: NEVER TRUE

## 2023-05-09 SDOH — ECONOMIC STABILITY: HOUSING INSECURITY
IN THE LAST 12 MONTHS, WAS THERE A TIME WHEN YOU DID NOT HAVE A STEADY PLACE TO SLEEP OR SLEPT IN A SHELTER (INCLUDING NOW)?: NO

## 2023-05-09 SDOH — ECONOMIC STABILITY: TRANSPORTATION INSECURITY
IN THE PAST 12 MONTHS, HAS LACK OF TRANSPORTATION KEPT YOU FROM MEETINGS, WORK, OR FROM GETTING THINGS NEEDED FOR DAILY LIVING?: NO

## 2023-05-09 SDOH — ECONOMIC STABILITY: INCOME INSECURITY: HOW HARD IS IT FOR YOU TO PAY FOR THE VERY BASICS LIKE FOOD, HOUSING, MEDICAL CARE, AND HEATING?: NOT HARD AT ALL

## 2023-05-09 SDOH — ECONOMIC STABILITY: FOOD INSECURITY: WITHIN THE PAST 12 MONTHS, YOU WORRIED THAT YOUR FOOD WOULD RUN OUT BEFORE YOU GOT MONEY TO BUY MORE.: NEVER TRUE

## 2023-05-10 ENCOUNTER — OFFICE VISIT (OUTPATIENT)
Facility: CLINIC | Age: 58
End: 2023-05-10
Payer: COMMERCIAL

## 2023-05-10 VITALS
DIASTOLIC BLOOD PRESSURE: 78 MMHG | TEMPERATURE: 97.6 F | HEIGHT: 66 IN | SYSTOLIC BLOOD PRESSURE: 125 MMHG | WEIGHT: 178 LBS | RESPIRATION RATE: 20 BRPM | HEART RATE: 69 BPM | BODY MASS INDEX: 28.61 KG/M2 | OXYGEN SATURATION: 95 %

## 2023-05-10 DIAGNOSIS — Z11.59 NEED FOR HEPATITIS C SCREENING TEST: ICD-10-CM

## 2023-05-10 DIAGNOSIS — Z86.32 HISTORY OF GESTATIONAL DIABETES: ICD-10-CM

## 2023-05-10 DIAGNOSIS — E66.3 OVERWEIGHT: Primary | ICD-10-CM

## 2023-05-10 PROCEDURE — 99214 OFFICE O/P EST MOD 30 MIN: CPT | Performed by: FAMILY MEDICINE

## 2023-05-10 PROCEDURE — G8419 CALC BMI OUT NRM PARAM NOF/U: HCPCS | Performed by: FAMILY MEDICINE

## 2023-05-10 PROCEDURE — G8428 CUR MEDS NOT DOCUMENT: HCPCS | Performed by: FAMILY MEDICINE

## 2023-05-10 PROCEDURE — 3017F COLORECTAL CA SCREEN DOC REV: CPT | Performed by: FAMILY MEDICINE

## 2023-05-10 PROCEDURE — 4004F PT TOBACCO SCREEN RCVD TLK: CPT | Performed by: FAMILY MEDICINE

## 2023-05-10 RX ORDER — SEMAGLUTIDE 1 MG/.5ML
1 INJECTION, SOLUTION SUBCUTANEOUS
Qty: 2 ML | Refills: 1 | Status: SHIPPED | OUTPATIENT
Start: 2023-05-10

## 2023-05-10 RX ORDER — SEMAGLUTIDE 1 MG/.5ML
1 INJECTION, SOLUTION SUBCUTANEOUS
COMMUNITY
End: 2023-05-10 | Stop reason: SDUPTHER

## 2023-05-10 ASSESSMENT — PATIENT HEALTH QUESTIONNAIRE - PHQ9
SUM OF ALL RESPONSES TO PHQ QUESTIONS 1-9: 0
2. FEELING DOWN, DEPRESSED OR HOPELESS: 0
SUM OF ALL RESPONSES TO PHQ9 QUESTIONS 1 & 2: 0
SUM OF ALL RESPONSES TO PHQ QUESTIONS 1-9: 0
1. LITTLE INTEREST OR PLEASURE IN DOING THINGS: 0

## 2023-05-10 ASSESSMENT — ENCOUNTER SYMPTOMS
NAUSEA: 1
VOMITING: 0

## 2023-05-10 NOTE — PROGRESS NOTES
5/10/2023     Chief Complaint   Patient presents with    Weight Management     Follow up - check weight          Subjective:      Patient ID: Tolu Brothers is a 59 y.o. female. Patient presents with:  Weight Management: Follow up - check weight    She also needs an A1C due to her history of gestational diabetes. Weight Management  . cc       Review of Systems     Objective:   Physical Exam     Vitals:    05/10/23 0914   BP: 125/78   Pulse: 69   Resp: 20   Temp: 97.6 °F (36.4 °C)   SpO2: 95%       Assessment/Plan:       Diagnosis Orders   1. Overweight  Semaglutide-Weight Management (WEGOVY) 1 MG/0.5ML SOAJ SC injection      2. History of gestational diabetes  Hemoglobin A4H    Basic Metabolic Panel      3. Need for hepatitis C screening test  Hepatitis C Antibody          Losing weight  Labs per orders. Increase Wegovy as planned    Return in about 2 months (around 7/10/2023) for check weight. Reviewed plan of care. Patient has provided input and agrees with goals.     Silvia Campbell MD

## 2023-05-10 NOTE — PROGRESS NOTES
Subjective:      Patient ID: Raimundo Berry is a 59 y.o. female. Patient presents with:  Weight Management: Follow up - check weight    She will be starting Wegovy, 1 mg a week next week. Also, she needs an A1C due to her history of gestational diabetes. Weight Management  Associated symptoms include nausea. Pertinent negatives include no vomiting. Review of Systems   Constitutional:         Weight loss - 8 lbs   Gastrointestinal:  Positive for nausea. Negative for vomiting. Mild, transient, am nausea     Objective:   Physical Exam  Constitutional:       General: She is not in acute distress. Appearance: Normal appearance. Neurological:      Mental Status: She is alert and oriented to person, place, and time. Psychiatric:         Behavior: Behavior normal.       Assessment:       Diagnosis Orders   1. Overweight  Semaglutide-Weight Management (WEGOVY) 1 MG/0.5ML SOAJ SC injection      2. History of gestational diabetes  Hemoglobin N7T    Basic Metabolic Panel      3. Need for hepatitis C screening test  Hepatitis C Antibody              Plan:      Losing weight  Labs per orders. Increase Wegovy next week    Return in about 2 months (around 7/10/2023) for check weight.           Anupam Garcia MD

## 2023-05-11 DIAGNOSIS — Z86.32 HISTORY OF GESTATIONAL DIABETES: ICD-10-CM

## 2023-05-11 LAB
BUN SERPL-MCNC: 13 MG/DL (ref 6–24)
BUN/CREAT SERPL: 13 (ref 9–23)
CALCIUM SERPL-MCNC: 9.9 MG/DL (ref 8.7–10.2)
CHLORIDE SERPL-SCNC: 101 MMOL/L (ref 96–106)
CO2 SERPL-SCNC: 23 MMOL/L (ref 20–29)
CREAT SERPL-MCNC: 1.02 MG/DL (ref 0.57–1)
EGFRCR SERPLBLD CKD-EPI 2021: 63 ML/MIN/1.73
GLUCOSE SERPL-MCNC: 92 MG/DL (ref 70–99)
HBA1C MFR BLD: 5.9 % (ref 4.8–5.6)
HCV IGG SERPL QL IA: NON REACTIVE
POTASSIUM SERPL-SCNC: 4.7 MMOL/L (ref 3.5–5.2)
SODIUM SERPL-SCNC: 137 MMOL/L (ref 134–144)

## 2023-05-18 ENCOUNTER — TELEMEDICINE (OUTPATIENT)
Facility: CLINIC | Age: 58
End: 2023-05-18
Payer: COMMERCIAL

## 2023-05-18 DIAGNOSIS — R73.03 PREDIABETES: ICD-10-CM

## 2023-05-18 PROCEDURE — G8419 CALC BMI OUT NRM PARAM NOF/U: HCPCS | Performed by: FAMILY MEDICINE

## 2023-05-18 PROCEDURE — 99212 OFFICE O/P EST SF 10 MIN: CPT | Performed by: FAMILY MEDICINE

## 2023-05-18 PROCEDURE — G8428 CUR MEDS NOT DOCUMENT: HCPCS | Performed by: FAMILY MEDICINE

## 2023-05-18 PROCEDURE — 3017F COLORECTAL CA SCREEN DOC REV: CPT | Performed by: FAMILY MEDICINE

## 2023-05-18 PROCEDURE — 4004F PT TOBACCO SCREEN RCVD TLK: CPT | Performed by: FAMILY MEDICINE

## 2023-05-18 ASSESSMENT — PATIENT HEALTH QUESTIONNAIRE - PHQ9
SUM OF ALL RESPONSES TO PHQ QUESTIONS 1-9: 0
1. LITTLE INTEREST OR PLEASURE IN DOING THINGS: 0
SUM OF ALL RESPONSES TO PHQ9 QUESTIONS 1 & 2: 0
2. FEELING DOWN, DEPRESSED OR HOPELESS: 0
SUM OF ALL RESPONSES TO PHQ QUESTIONS 1-9: 0

## 2023-05-18 NOTE — PROGRESS NOTES
Andrew Argueta (:  1965) is a Established patient, presenting virtually for evaluation of the following:    Assessment & Plan   Below is the assessment and plan developed based on review of pertinent history, physical exam, labs, studies, and medications. 1. Prediabetes    Metformin discussed, would rather try lifestyle modifications first  Regular exercise, weight loss, decrease carbs    Return in about 6 months (around 2023) for Prediabetes. Subjective   Patient presents with:  Results: Discuss lab results     Her A1C was 5.9. Review of Systems   Eyes:  Negative for visual disturbance. Endocrine: Negative for polydipsia and polyuria. Objective   Patient-Reported Vitals  No data recorded     Physical Exam  Constitutional:       General: She is not in acute distress. Appearance: Normal appearance. Neurological:      Mental Status: She is alert and oriented to person, place, and time. Psychiatric:         Behavior: Behavior normal.              Andrew Argueta, was evaluated through a synchronous (real-time) audio-video encounter. The patient (or guardian if applicable) is aware that this is a billable service, which includes applicable co-pays. This Virtual Visit was conducted with patient's (and/or legal guardian's) consent. Patient identification was verified, and a caregiver was present when appropriate.    The patient was located at Home: [facility]  Provider was located at Home ([facility]): Victor Hugo Richardson MD

## 2023-06-06 DIAGNOSIS — E66.3 OVERWEIGHT: ICD-10-CM

## 2023-06-06 RX ORDER — SEMAGLUTIDE 1 MG/.5ML
1 INJECTION, SOLUTION SUBCUTANEOUS
Qty: 2 ML | Refills: 1 | Status: SHIPPED | OUTPATIENT
Start: 2023-06-06

## 2023-06-06 NOTE — TELEPHONE ENCOUNTER
----- Message from [facility] sent at 6/6/2023 11:01 AM EDT -----  Subject: Refill Request    QUESTIONS  Name of Medication? Semaglutide-Weight Management (WEGOVY) 1 MG/0.5ML SOAJ   SC injection  Patient-reported dosage and instructions? once a week  How many days do you have left? 1  Preferred Pharmacy? [id number]  Pharmacy phone number (if available)? [phone]  Additional Information for Provider? patient needs to increase dosage to   1.7MG  ---------------------------------------------------------------------------  --------------  CALL BACK INFO  What is the best way for the office to contact you? OK to leave message on   voicemail  Preferred Call Back Phone Number? [phone]  ---------------------------------------------------------------------------  --------------  SCRIPT ANSWERS  Relationship to Patient?  Self

## 2023-06-19 ENCOUNTER — TELEPHONE (OUTPATIENT)
Facility: CLINIC | Age: 58
End: 2023-06-19

## 2023-06-19 RX ORDER — SEMAGLUTIDE 1.7 MG/.75ML
1.7 INJECTION, SOLUTION SUBCUTANEOUS
Qty: 9 ML | Refills: 0 | Status: SHIPPED | OUTPATIENT
Start: 2023-06-19

## 2023-06-19 NOTE — TELEPHONE ENCOUNTER
Pharmacy tech calling from [place], she accidentally deleted the PT's rx in the computer. Please resend. Wegovy 1.7 mg .75 ml. Injectable.     Inna @  [facility]  Michele Dudley

## 2023-07-07 ENCOUNTER — TELEPHONE (OUTPATIENT)
Facility: CLINIC | Age: 58
End: 2023-07-07

## 2023-07-10 RX ORDER — SEMAGLUTIDE 2.4 MG/.75ML
2.4 INJECTION, SOLUTION SUBCUTANEOUS
Qty: 3 ML | Refills: 1 | Status: SHIPPED | OUTPATIENT
Start: 2023-07-10

## 2023-07-13 ENCOUNTER — TELEPHONE (OUTPATIENT)
Facility: CLINIC | Age: 58
End: 2023-07-13

## 2023-09-06 ENCOUNTER — TELEPHONE (OUTPATIENT)
Facility: CLINIC | Age: 58
End: 2023-09-06

## 2023-09-07 NOTE — TELEPHONE ENCOUNTER
Phone call with patient. Left message on machine that I was sincerely sorry about today and for her to call me on my cell phone to see what I can do to help and accommodate her needs.

## 2023-10-03 ENCOUNTER — APPOINTMENT (OUTPATIENT)
Facility: HOSPITAL | Age: 58
End: 2023-10-03
Payer: COMMERCIAL

## 2023-10-03 ENCOUNTER — HOSPITAL ENCOUNTER (EMERGENCY)
Facility: HOSPITAL | Age: 58
Discharge: HOME | End: 2023-10-03
Attending: EMERGENCY MEDICINE
Payer: COMMERCIAL

## 2023-10-03 VITALS
TEMPERATURE: 98.4 F | DIASTOLIC BLOOD PRESSURE: 71 MMHG | WEIGHT: 167 LBS | HEIGHT: 66 IN | OXYGEN SATURATION: 97 % | SYSTOLIC BLOOD PRESSURE: 145 MMHG | BODY MASS INDEX: 26.84 KG/M2 | RESPIRATION RATE: 16 BRPM | HEART RATE: 57 BPM

## 2023-10-03 DIAGNOSIS — R07.9 CHEST PAIN, UNSPECIFIED TYPE: Primary | ICD-10-CM

## 2023-10-03 LAB
ALBUMIN SERPL-MCNC: 3.8 G/DL (ref 3.5–5)
ALBUMIN/GLOB SERPL: 1.1 (ref 1.1–2.2)
ALP SERPL-CCNC: 75 U/L (ref 45–117)
ALT SERPL-CCNC: 27 U/L (ref 12–78)
ANION GAP SERPL CALC-SCNC: 0 MMOL/L (ref 5–15)
AST SERPL-CCNC: 14 U/L (ref 15–37)
BASOPHILS # BLD: 0 K/UL (ref 0–0.1)
BASOPHILS NFR BLD: 1 % (ref 0–1)
BILIRUB SERPL-MCNC: 0.7 MG/DL (ref 0.2–1)
BUN SERPL-MCNC: 10 MG/DL (ref 6–20)
BUN/CREAT SERPL: 9 (ref 12–20)
CALCIUM SERPL-MCNC: 9.3 MG/DL (ref 8.5–10.1)
CHLORIDE SERPL-SCNC: 105 MMOL/L (ref 97–108)
CO2 SERPL-SCNC: 31 MMOL/L (ref 21–32)
COMMENT:: NORMAL
CREAT SERPL-MCNC: 1.13 MG/DL (ref 0.55–1.02)
D DIMER PPP FEU-MCNC: 0.28 MG/L FEU (ref 0–0.65)
DIFFERENTIAL METHOD BLD: NORMAL
EOSINOPHIL # BLD: 0.1 K/UL (ref 0–0.4)
EOSINOPHIL NFR BLD: 2 % (ref 0–7)
ERYTHROCYTE [DISTWIDTH] IN BLOOD BY AUTOMATED COUNT: 12.1 % (ref 11.5–14.5)
GLOBULIN SER CALC-MCNC: 3.5 G/DL (ref 2–4)
GLUCOSE SERPL-MCNC: 145 MG/DL (ref 65–100)
HCT VFR BLD AUTO: 38.9 % (ref 35–47)
HGB BLD-MCNC: 12.8 G/DL (ref 11.5–16)
IMM GRANULOCYTES # BLD AUTO: 0 K/UL (ref 0–0.04)
IMM GRANULOCYTES NFR BLD AUTO: 0 % (ref 0–0.5)
LYMPHOCYTES # BLD: 0.9 K/UL (ref 0.8–3.5)
LYMPHOCYTES NFR BLD: 21 % (ref 12–49)
MCH RBC QN AUTO: 30.5 PG (ref 26–34)
MCHC RBC AUTO-ENTMCNC: 32.9 G/DL (ref 30–36.5)
MCV RBC AUTO: 92.8 FL (ref 80–99)
MONOCYTES # BLD: 0.3 K/UL (ref 0–1)
MONOCYTES NFR BLD: 8 % (ref 5–13)
NEUTS SEG # BLD: 3 K/UL (ref 1.8–8)
NEUTS SEG NFR BLD: 68 % (ref 32–75)
NRBC # BLD: 0 K/UL (ref 0–0.01)
NRBC BLD-RTO: 0 PER 100 WBC
PLATELET # BLD AUTO: 249 K/UL (ref 150–400)
PMV BLD AUTO: 10.9 FL (ref 8.9–12.9)
POTASSIUM SERPL-SCNC: 4.3 MMOL/L (ref 3.5–5.1)
PROT SERPL-MCNC: 7.3 G/DL (ref 6.4–8.2)
RBC # BLD AUTO: 4.19 M/UL (ref 3.8–5.2)
SODIUM SERPL-SCNC: 136 MMOL/L (ref 136–145)
SPECIMEN HOLD: NORMAL
TROPONIN I SERPL HS-MCNC: 5 NG/L (ref 0–51)
WBC # BLD AUTO: 4.4 K/UL (ref 3.6–11)

## 2023-10-03 PROCEDURE — 99285 EMERGENCY DEPT VISIT HI MDM: CPT

## 2023-10-03 PROCEDURE — 96374 THER/PROPH/DIAG INJ IV PUSH: CPT

## 2023-10-03 PROCEDURE — 36415 COLL VENOUS BLD VENIPUNCTURE: CPT

## 2023-10-03 PROCEDURE — 2580000003 HC RX 258: Performed by: EMERGENCY MEDICINE

## 2023-10-03 PROCEDURE — 71045 X-RAY EXAM CHEST 1 VIEW: CPT

## 2023-10-03 PROCEDURE — 85379 FIBRIN DEGRADATION QUANT: CPT

## 2023-10-03 PROCEDURE — 93005 ELECTROCARDIOGRAM TRACING: CPT | Performed by: EMERGENCY MEDICINE

## 2023-10-03 PROCEDURE — 6360000002 HC RX W HCPCS: Performed by: EMERGENCY MEDICINE

## 2023-10-03 PROCEDURE — 6360000004 HC RX CONTRAST MEDICATION: Performed by: EMERGENCY MEDICINE

## 2023-10-03 PROCEDURE — 85025 COMPLETE CBC W/AUTO DIFF WBC: CPT

## 2023-10-03 PROCEDURE — 80053 COMPREHEN METABOLIC PANEL: CPT

## 2023-10-03 PROCEDURE — 84484 ASSAY OF TROPONIN QUANT: CPT

## 2023-10-03 PROCEDURE — 71275 CT ANGIOGRAPHY CHEST: CPT

## 2023-10-03 RX ORDER — IBUPROFEN 600 MG/1
600 TABLET ORAL EVERY 8 HOURS PRN
Qty: 100 TABLET | Refills: 0 | Status: SHIPPED | OUTPATIENT
Start: 2023-10-03

## 2023-10-03 RX ORDER — KETOROLAC TROMETHAMINE 30 MG/ML
30 INJECTION, SOLUTION INTRAMUSCULAR; INTRAVENOUS
Status: COMPLETED | OUTPATIENT
Start: 2023-10-03 | End: 2023-10-03

## 2023-10-03 RX ORDER — 0.9 % SODIUM CHLORIDE 0.9 %
1000 INTRAVENOUS SOLUTION INTRAVENOUS ONCE
Status: COMPLETED | OUTPATIENT
Start: 2023-10-03 | End: 2023-10-03

## 2023-10-03 RX ORDER — METHOCARBAMOL 750 MG/1
750 TABLET, FILM COATED ORAL 4 TIMES DAILY
Qty: 56 TABLET | Refills: 0 | Status: SHIPPED | OUTPATIENT
Start: 2023-10-03 | End: 2023-10-17

## 2023-10-03 RX ADMIN — KETOROLAC TROMETHAMINE 30 MG: 30 INJECTION, SOLUTION INTRAMUSCULAR; INTRAVENOUS at 07:32

## 2023-10-03 RX ADMIN — SODIUM CHLORIDE 1000 ML: 9 INJECTION, SOLUTION INTRAVENOUS at 07:31

## 2023-10-03 RX ADMIN — IOPAMIDOL 80 ML: 755 INJECTION, SOLUTION INTRAVENOUS at 08:23

## 2023-10-03 ASSESSMENT — PAIN SCALES - GENERAL
PAINLEVEL_OUTOF10: 6
PAINLEVEL_OUTOF10: 5

## 2023-10-03 ASSESSMENT — PAIN DESCRIPTION - DESCRIPTORS
DESCRIPTORS: DULL
DESCRIPTORS: STABBING

## 2023-10-03 ASSESSMENT — PAIN - FUNCTIONAL ASSESSMENT: PAIN_FUNCTIONAL_ASSESSMENT: 0-10

## 2023-10-03 ASSESSMENT — PAIN DESCRIPTION - ORIENTATION: ORIENTATION: LEFT

## 2023-10-03 ASSESSMENT — PAIN DESCRIPTION - LOCATION: LOCATION: CHEST

## 2023-10-03 NOTE — ED NOTES
Patient does not appear to be in any acute distress/shows no evidence of clinical instability at this time. Provider has reviewed discharge instructions with the patient. The patient verbalized understanding instructions as well as need for follow up for any further symptoms. Discharge papers given, education provided, and any questions answered. Patient discharged by provider.       Sylvester Barber RN  10/03/23 6300

## 2023-10-03 NOTE — ED NOTES
Patient sitting upright in stretcher, respirations even and unlabored, skin warm and dry. Pt remains on cardiac monitor x 3. Call bell placed on stretcher and pt instructed on use.       Imtiaz Ambrosio RN  10/03/23 1402

## 2023-10-03 NOTE — ED PROVIDER NOTES
negative for PE or dissection. Reviewed results with the patient, will encourage her to follow-up with her primary care doctor and cardiologist for further work-up. We discussed return precautions and follow-up instructions [DT]      ED Course User Index  [DT] Tabatha Bentley MD  [ZD] Roxane Justice MD         HISTORY OF PRESENT ILLNESS    PT ambulated into triage. She is having \"all sorts of pain around the heart\" which started 2 days ago. Pain rated 5/10, not constant just sharp and piercing. Pt states she has a little bit of SOB, denies N/V but had some dizziness and hot feelings this am when she got up. Endorses blood clots after ankle surgery in 2004. Not currently taking blood thinners. 78-year-old female presenting to the ER with complaint of left-sided chest pain for the last 2 days. Nursing Notes were reviewed.     REVIEW OF SYSTEMS       Review of Systems      PAST MEDICAL HISTORY     Past Medical History:   Diagnosis Date    Anemia     DVT (deep vein thrombosis) in pregnancy     2005    Family history of premature CAD 12/14/2016    GERD (gastroesophageal reflux disease)     H. pylori infection 7/2013    Hypercholesterolemia 3/9/2021    Meralgia paresthetica of left side 12/14/2016    Migraine with aura and without status migrainosus, not intractable 12/14/2016    PE (pulmonary embolism)     2005    Prediabetes 5/18/2023    Thromboembolus ([address])     mom did too         SURGICAL HISTORY       Past Surgical History:   Procedure Laterality Date    ANKLE FRACTURE SURGERY  2005    left ankle-hardware    GYN      hysterectomy    ORTHOPEDIC SURGERY      Carpel Tunnel surgery    REFRACTIVE SURGERY           CURRENT MEDICATIONS       Previous Medications    ASHWAGANDHA 120 MG CAPS    Take by mouth    ASTAXANTHIN 4 MG CAPS    Take by mouth    BIOTIN 2.5 MG CAPS    Take by mouth    CYANOCOBALAMIN 100 MCG TABLET    Take 1 tablet by mouth daily    FOLIC ACID (FOLVITE) 416 MCG TABLET    Take 1 tablet

## 2023-10-03 NOTE — ED TRIAGE NOTES
PT ambulated into triage. She is having \"all sorts of pain around the heart\" which started 2 days ago. Pain rated 5/10, not constant just sharp and piercing. Pt states she has a little bit of SOB, denies N/V but had some dizziness and hot feelings this am when she got up. Endorses blood clots after ankle surgery in 2004. Not currently taking blood thinners. Did not take anything for the pain.
1

## 2023-10-04 LAB
EKG ATRIAL RATE: 70 BPM
EKG DIAGNOSIS: NORMAL
EKG P AXIS: 64 DEGREES
EKG P-R INTERVAL: 174 MS
EKG Q-T INTERVAL: 404 MS
EKG QRS DURATION: 74 MS
EKG QTC CALCULATION (BAZETT): 436 MS
EKG R AXIS: 61 DEGREES
EKG T AXIS: 41 DEGREES
EKG VENTRICULAR RATE: 70 BPM

## 2023-10-04 PROCEDURE — 93010 ELECTROCARDIOGRAM REPORT: CPT | Performed by: STUDENT IN AN ORGANIZED HEALTH CARE EDUCATION/TRAINING PROGRAM

## 2023-11-29 ENCOUNTER — TELEPHONE (OUTPATIENT)
Facility: CLINIC | Age: 58
End: 2023-11-29

## 2023-11-29 NOTE — TELEPHONE ENCOUNTER
Made new pt appt and addressed in [employer]    ----- Message from Jarrod Cedeno sent at 11/29/2023  9:04 AM EST -----  Subject: Appointment Request    Reason for Call: New Patient/New to Provider Appointment needed: New   Patient Request Appointment    QUESTIONS    Reason for appointment request? Available appointments did not meet   patient need     Additional Information for Provider? former patient of Dr. Vita Anaya wants to   establish care with Dr. Shade Veras, patient states needs yearly exam with   labs  ---------------------------------------------------------------------------  --------------  [address]  [phone]; OK to leave message on voicemail  ---------------------------------------------------------------------------  --------------  SCRIPT ANSWERS

## 2024-01-10 ENCOUNTER — TELEPHONE (OUTPATIENT)
Facility: CLINIC | Age: 59
End: 2024-01-10

## 2024-01-10 NOTE — TELEPHONE ENCOUNTER
----- Message from Adry Sheikhntosh sent at 1/10/2024 12:01 PM EST -----  Subject: Message to Provider    QUESTIONS  Information for Provider? Patient would like to confirm if she has   completed a medical release form to have her records sent to her new pcp   office. Please advise.   ---------------------------------------------------------------------------  --------------  CALL BACK INFO  [phone]; OK to leave message on voicemail  ---------------------------------------------------------------------------  --------------  SCRIPT ANSWERS  Relationship to Patient? Self

## 2024-01-15 LAB — COLONOSCOPY, EXTERNAL: NORMAL

## 2025-07-31 ENCOUNTER — TRANSCRIBE ORDERS (OUTPATIENT)
Facility: HOSPITAL | Age: 60
End: 2025-07-31

## 2025-07-31 DIAGNOSIS — Z13.6 ENCOUNTER FOR SCREENING FOR CARDIOVASCULAR DISORDERS: Primary | ICD-10-CM

## 2025-08-20 ENCOUNTER — HOSPITAL ENCOUNTER (OUTPATIENT)
Facility: HOSPITAL | Age: 60
Discharge: HOME | End: 2025-08-23

## 2025-08-20 DIAGNOSIS — Z13.6 ENCOUNTER FOR SCREENING FOR CARDIOVASCULAR DISORDERS: ICD-10-CM

## 2025-08-20 PROCEDURE — 75571 CT HRT W/O DYE W/CA TEST: CPT
